# Patient Record
Sex: FEMALE | Race: WHITE | NOT HISPANIC OR LATINO | Employment: FULL TIME | URBAN - METROPOLITAN AREA
[De-identification: names, ages, dates, MRNs, and addresses within clinical notes are randomized per-mention and may not be internally consistent; named-entity substitution may affect disease eponyms.]

---

## 2023-02-21 PROBLEM — H52.13 MYOPIA, BILATERAL: Status: ACTIVE | Noted: 2023-02-21

## 2023-02-21 PROBLEM — M79.672 LEFT FOOT PAIN: Status: ACTIVE | Noted: 2023-02-21

## 2023-02-21 PROBLEM — M20.60 TOE DEFORMITY: Status: ACTIVE | Noted: 2023-02-21

## 2023-02-21 PROBLEM — S83.412A SPRAIN OF MEDIAL COLLATERAL LIGAMENT OF LEFT KNEE: Status: ACTIVE | Noted: 2023-02-21

## 2023-02-21 PROBLEM — M23.209 CHRONIC MENISCAL TEAR OF KNEE: Status: ACTIVE | Noted: 2023-02-21

## 2023-02-21 PROBLEM — M72.2 PLANTAR FASCIA SYNDROME: Status: ACTIVE | Noted: 2023-02-21

## 2023-02-21 PROBLEM — E66.813 OBESITY, CLASS III, BMI 40-49.9 (MORBID OBESITY): Status: ACTIVE | Noted: 2023-02-21

## 2023-02-21 PROBLEM — I10 ESSENTIAL HYPERTENSION: Status: ACTIVE | Noted: 2023-02-21

## 2023-02-21 PROBLEM — N91.2 AMENORRHEA: Status: ACTIVE | Noted: 2023-02-21

## 2023-02-21 PROBLEM — B37.2 YEAST INFECTION OF THE SKIN: Status: ACTIVE | Noted: 2023-02-21

## 2023-02-21 PROBLEM — S83.511A: Status: ACTIVE | Noted: 2023-02-21

## 2023-02-21 PROBLEM — E66.9 OBESITY (BMI 35.0-39.9 WITHOUT COMORBIDITY): Status: ACTIVE | Noted: 2023-02-21

## 2023-02-21 PROBLEM — M77.42 METATARSALGIA OF LEFT FOOT: Status: ACTIVE | Noted: 2023-02-21

## 2023-02-21 PROBLEM — R73.9 BLOOD GLUCOSE ELEVATED: Status: ACTIVE | Noted: 2023-02-21

## 2023-02-21 PROBLEM — O10.919 HTN IN PREGNANCY, CHRONIC (HHS-HCC): Status: ACTIVE | Noted: 2023-02-21

## 2023-02-21 PROBLEM — R92.8 ABNORMAL MAMMOGRAM: Status: ACTIVE | Noted: 2023-02-21

## 2023-02-21 PROBLEM — M25.562 LEFT KNEE PAIN: Status: ACTIVE | Noted: 2023-02-21

## 2023-02-21 PROBLEM — M79.671 RIGHT FOOT PAIN: Status: ACTIVE | Noted: 2023-02-21

## 2023-02-21 PROBLEM — E66.01 OBESITY, CLASS III, BMI 40-49.9 (MORBID OBESITY) (MULTI): Status: ACTIVE | Noted: 2023-02-21

## 2023-02-21 PROBLEM — M25.362 INSTABILITY OF LEFT KNEE JOINT: Status: ACTIVE | Noted: 2023-02-21

## 2023-02-21 PROBLEM — M84.376A STRESS REACTION OF FOOT: Status: ACTIVE | Noted: 2023-02-21

## 2023-02-21 PROBLEM — S83.241A TEAR OF MEDIAL MENISCUS OF RIGHT KNEE, INITIAL ENCOUNTER: Status: ACTIVE | Noted: 2023-02-21

## 2023-02-21 PROBLEM — M25.572 ARTHRALGIA OF ANKLE OR FOOT, LEFT: Status: ACTIVE | Noted: 2023-02-21

## 2023-02-21 PROBLEM — H60.509 ACUTE OTITIS EXTERNA: Status: ACTIVE | Noted: 2023-02-21

## 2023-02-21 PROBLEM — M17.11 PRIMARY OSTEOARTHRITIS OF RIGHT KNEE: Status: ACTIVE | Noted: 2023-02-21

## 2023-02-21 PROBLEM — E06.3 HASHIMOTO'S THYROIDITIS: Status: ACTIVE | Noted: 2023-02-21

## 2023-02-21 RX ORDER — MELOXICAM 15 MG/1
15 TABLET ORAL DAILY PRN
COMMUNITY
Start: 2021-07-08 | End: 2023-10-25 | Stop reason: ALTCHOICE

## 2023-02-21 RX ORDER — METFORMIN HYDROCHLORIDE 500 MG/1
500 TABLET ORAL DAILY
COMMUNITY
Start: 2022-07-13 | End: 2023-10-25 | Stop reason: ALTCHOICE

## 2023-02-21 RX ORDER — DULAGLUTIDE 4.5 MG/.5ML
0.5 INJECTION, SOLUTION SUBCUTANEOUS
COMMUNITY
Start: 2022-07-13 | End: 2023-03-21

## 2023-02-21 RX ORDER — SEMAGLUTIDE 1.34 MG/ML
INJECTION, SOLUTION SUBCUTANEOUS
COMMUNITY
End: 2023-10-25

## 2023-02-21 RX ORDER — PHENTERMINE HYDROCHLORIDE 37.5 MG/1
37.5 TABLET ORAL DAILY
COMMUNITY
Start: 2022-04-11 | End: 2023-03-21

## 2023-02-21 RX ORDER — LOSARTAN POTASSIUM 50 MG/1
50 TABLET ORAL DAILY
COMMUNITY
Start: 2022-04-11 | End: 2023-06-21

## 2023-02-21 RX ORDER — NAPROXEN SODIUM 220 MG/1
TABLET, FILM COATED ORAL
COMMUNITY

## 2023-02-21 RX ORDER — CHOLECALCIFEROL (VITAMIN D3) 50 MCG
TABLET ORAL
COMMUNITY
End: 2023-10-25 | Stop reason: ALTCHOICE

## 2023-02-21 RX ORDER — FLUOCINOLONE ACETONIDE 0.11 MG/ML
OIL AURICULAR (OTIC) 2 TIMES DAILY
COMMUNITY
Start: 2019-08-24 | End: 2023-12-12 | Stop reason: WASHOUT

## 2023-02-21 RX ORDER — LEVOTHYROXINE SODIUM 200 UG/1
1 TABLET ORAL DAILY
COMMUNITY
Start: 2019-07-02 | End: 2023-06-14 | Stop reason: ALTCHOICE

## 2023-02-22 PROBLEM — M25.561 RIGHT KNEE PAIN: Status: ACTIVE | Noted: 2023-02-22

## 2023-03-21 ENCOUNTER — OFFICE VISIT (OUTPATIENT)
Dept: PRIMARY CARE | Facility: CLINIC | Age: 48
End: 2023-03-21
Payer: COMMERCIAL

## 2023-03-21 VITALS
WEIGHT: 218 LBS | HEIGHT: 66 IN | BODY MASS INDEX: 35.03 KG/M2 | DIASTOLIC BLOOD PRESSURE: 82 MMHG | SYSTOLIC BLOOD PRESSURE: 117 MMHG

## 2023-03-21 DIAGNOSIS — Z12.31 BREAST CANCER SCREENING BY MAMMOGRAM: Primary | ICD-10-CM

## 2023-03-21 DIAGNOSIS — Z00.00 ROUTINE GENERAL MEDICAL EXAMINATION AT A HEALTH CARE FACILITY: ICD-10-CM

## 2023-03-21 PROBLEM — H60.509 ACUTE OTITIS EXTERNA: Status: RESOLVED | Noted: 2023-02-21 | Resolved: 2023-03-21

## 2023-03-21 PROBLEM — B37.2 YEAST INFECTION OF THE SKIN: Status: RESOLVED | Noted: 2023-02-21 | Resolved: 2023-03-21

## 2023-03-21 PROBLEM — N91.2 AMENORRHEA: Status: RESOLVED | Noted: 2023-02-21 | Resolved: 2023-03-21

## 2023-03-21 PROCEDURE — 3074F SYST BP LT 130 MM HG: CPT | Performed by: INTERNAL MEDICINE

## 2023-03-21 PROCEDURE — 99213 OFFICE O/P EST LOW 20 MIN: CPT | Performed by: INTERNAL MEDICINE

## 2023-03-21 PROCEDURE — 3079F DIAST BP 80-89 MM HG: CPT | Performed by: INTERNAL MEDICINE

## 2023-03-21 NOTE — PROGRESS NOTES
"Sobeida Ferrer presents in obesity FU.     1. Obesity s/p phentermine 5.22 and re-initiated 1.2023 with me   -Trulicity since summer 2022 until cost prohibitive   247 lbs in 7.22   222 lbs last visit 1.23 and again 2.14.23 despite initiaton phnetermine 1.23   -218 today   []d/c phentermine   []ozempic (1 mg dose) via [facility] is en route   []3 mo FU with me     2. HTN   -losart 50 4.22     3. Hypothyroidism   -ltx 200   -TSH at goal 4.22     4. DLD,  in 4.22     5. Ear psoriasis   -tacrolimus via derm     6. R knee pain, h/o L knee MCL sprain   -R knee MRI: Tricompartmental OA      #HM   -screen labs 4.22  -mammo due 3.31.23 - ordered  [ ]cscope now due - previously ordered   -tdap 2018 4/11/2022     9:07 AM 5/17/2022     8:10 AM 6/17/2022     9:50 AM 7/13/2022     8:52 AM 7/13/2022     8:54 AM   Vitals   Systolic 137 117 121     Diastolic 83 77 79     Heart Rate 76  87     Height (in) 1.676 m (5' 6\") 1.676 m (5' 6\") 1.676 m (5' 6\") 1.676 m (5' 6\") 1.676 m (5' 6\")   Weight (lb) 269 262 253  247   BMI 43.42 kg/m2 42.29 kg/m2 40.84 kg/m2 40.84 kg/m2 39.87 kg/m2   BSA (m2) 2.38 m2 2.35 m2 2.31 m2 2.31 m2 2.28 m2           "

## 2023-06-07 ENCOUNTER — LAB (OUTPATIENT)
Dept: LAB | Facility: LAB | Age: 48
End: 2023-06-07
Payer: COMMERCIAL

## 2023-06-07 DIAGNOSIS — Z00.00 ROUTINE GENERAL MEDICAL EXAMINATION AT A HEALTH CARE FACILITY: ICD-10-CM

## 2023-06-07 LAB
ALANINE AMINOTRANSFERASE (SGPT) (U/L) IN SER/PLAS: 11 U/L (ref 7–45)
ALBUMIN (G/DL) IN SER/PLAS: 4.3 G/DL (ref 3.4–5)
ALKALINE PHOSPHATASE (U/L) IN SER/PLAS: 75 U/L (ref 33–110)
ANION GAP IN SER/PLAS: 13 MMOL/L (ref 10–20)
ASPARTATE AMINOTRANSFERASE (SGOT) (U/L) IN SER/PLAS: 15 U/L (ref 9–39)
BASOPHILS (10*3/UL) IN BLOOD BY AUTOMATED COUNT: 0.05 X10E9/L (ref 0–0.1)
BASOPHILS/100 LEUKOCYTES IN BLOOD BY AUTOMATED COUNT: 0.6 % (ref 0–2)
BILIRUBIN TOTAL (MG/DL) IN SER/PLAS: 0.5 MG/DL (ref 0–1.2)
CALCIDIOL (25 OH VITAMIN D3) (NG/ML) IN SER/PLAS: 26 NG/ML
CALCIUM (MG/DL) IN SER/PLAS: 9.7 MG/DL (ref 8.6–10.6)
CARBON DIOXIDE, TOTAL (MMOL/L) IN SER/PLAS: 26 MMOL/L (ref 21–32)
CHLORIDE (MMOL/L) IN SER/PLAS: 106 MMOL/L (ref 98–107)
CHOLESTEROL (MG/DL) IN SER/PLAS: 174 MG/DL (ref 0–199)
CHOLESTEROL IN HDL (MG/DL) IN SER/PLAS: 45.6 MG/DL
CHOLESTEROL/HDL RATIO: 3.8
CREATININE (MG/DL) IN SER/PLAS: 0.77 MG/DL (ref 0.5–1.05)
EOSINOPHILS (10*3/UL) IN BLOOD BY AUTOMATED COUNT: 0.29 X10E9/L (ref 0–0.7)
EOSINOPHILS/100 LEUKOCYTES IN BLOOD BY AUTOMATED COUNT: 3.7 % (ref 0–6)
ERYTHROCYTE DISTRIBUTION WIDTH (RATIO) BY AUTOMATED COUNT: 12.9 % (ref 11.5–14.5)
ERYTHROCYTE MEAN CORPUSCULAR HEMOGLOBIN CONCENTRATION (G/DL) BY AUTOMATED: 33.6 G/DL (ref 32–36)
ERYTHROCYTE MEAN CORPUSCULAR VOLUME (FL) BY AUTOMATED COUNT: 88 FL (ref 80–100)
ERYTHROCYTES (10*6/UL) IN BLOOD BY AUTOMATED COUNT: 4.66 X10E12/L (ref 4–5.2)
ESTIMATED AVERAGE GLUCOSE FOR HBA1C: 103 MG/DL
GFR FEMALE: >90 ML/MIN/1.73M2
GLUCOSE (MG/DL) IN SER/PLAS: 89 MG/DL (ref 74–99)
HEMATOCRIT (%) IN BLOOD BY AUTOMATED COUNT: 41.1 % (ref 36–46)
HEMOGLOBIN (G/DL) IN BLOOD: 13.8 G/DL (ref 12–16)
HEMOGLOBIN A1C/HEMOGLOBIN TOTAL IN BLOOD: 5.2 %
IMMATURE GRANULOCYTES/100 LEUKOCYTES IN BLOOD BY AUTOMATED COUNT: 0.3 % (ref 0–0.9)
LDL: 112 MG/DL (ref 0–99)
LEUKOCYTES (10*3/UL) IN BLOOD BY AUTOMATED COUNT: 7.9 X10E9/L (ref 4.4–11.3)
LYMPHOCYTES (10*3/UL) IN BLOOD BY AUTOMATED COUNT: 2.69 X10E9/L (ref 1.2–4.8)
LYMPHOCYTES/100 LEUKOCYTES IN BLOOD BY AUTOMATED COUNT: 34 % (ref 13–44)
MONOCYTES (10*3/UL) IN BLOOD BY AUTOMATED COUNT: 0.5 X10E9/L (ref 0.1–1)
MONOCYTES/100 LEUKOCYTES IN BLOOD BY AUTOMATED COUNT: 6.3 % (ref 2–10)
NEUTROPHILS (10*3/UL) IN BLOOD BY AUTOMATED COUNT: 4.36 X10E9/L (ref 1.2–7.7)
NEUTROPHILS/100 LEUKOCYTES IN BLOOD BY AUTOMATED COUNT: 55.1 % (ref 40–80)
NRBC (PER 100 WBCS) BY AUTOMATED COUNT: 0 /100 WBC (ref 0–0)
PLATELETS (10*3/UL) IN BLOOD AUTOMATED COUNT: 346 X10E9/L (ref 150–450)
POTASSIUM (MMOL/L) IN SER/PLAS: 4.8 MMOL/L (ref 3.5–5.3)
PROTEIN TOTAL: 7.2 G/DL (ref 6.4–8.2)
SODIUM (MMOL/L) IN SER/PLAS: 140 MMOL/L (ref 136–145)
THYROTROPIN (MIU/L) IN SER/PLAS BY DETECTION LIMIT <= 0.05 MIU/L: 0.11 MIU/L (ref 0.44–3.98)
THYROXINE (T4) FREE (NG/DL) IN SER/PLAS: 1.33 NG/DL (ref 0.78–1.48)
TRIGLYCERIDE (MG/DL) IN SER/PLAS: 81 MG/DL (ref 0–149)
UREA NITROGEN (MG/DL) IN SER/PLAS: 14 MG/DL (ref 6–23)
VLDL: 16 MG/DL (ref 0–40)

## 2023-06-07 PROCEDURE — 82306 VITAMIN D 25 HYDROXY: CPT

## 2023-06-07 PROCEDURE — 80061 LIPID PANEL: CPT

## 2023-06-07 PROCEDURE — 85025 COMPLETE CBC W/AUTO DIFF WBC: CPT

## 2023-06-07 PROCEDURE — 80053 COMPREHEN METABOLIC PANEL: CPT

## 2023-06-07 PROCEDURE — 36415 COLL VENOUS BLD VENIPUNCTURE: CPT

## 2023-06-07 PROCEDURE — 84443 ASSAY THYROID STIM HORMONE: CPT

## 2023-06-07 PROCEDURE — 84439 ASSAY OF FREE THYROXINE: CPT

## 2023-06-07 PROCEDURE — 83036 HEMOGLOBIN GLYCOSYLATED A1C: CPT

## 2023-06-13 ENCOUNTER — PATIENT MESSAGE (OUTPATIENT)
Dept: PRIMARY CARE | Facility: CLINIC | Age: 48
End: 2023-06-13
Payer: COMMERCIAL

## 2023-06-13 DIAGNOSIS — E03.9 ADULT HYPOTHYROIDISM: Primary | ICD-10-CM

## 2023-06-14 RX ORDER — LEVOTHYROXINE SODIUM 175 UG/1
175 TABLET ORAL
Qty: 90 TABLET | Refills: 3 | Status: SHIPPED | OUTPATIENT
Start: 2023-06-14 | End: 2023-06-14

## 2023-06-21 ENCOUNTER — OFFICE VISIT (OUTPATIENT)
Dept: PRIMARY CARE | Facility: CLINIC | Age: 48
End: 2023-06-21
Payer: COMMERCIAL

## 2023-06-21 VITALS
BODY MASS INDEX: 34.55 KG/M2 | SYSTOLIC BLOOD PRESSURE: 117 MMHG | WEIGHT: 215 LBS | HEIGHT: 66 IN | DIASTOLIC BLOOD PRESSURE: 72 MMHG

## 2023-06-21 DIAGNOSIS — T75.3XXA MOTION SICKNESS, INITIAL ENCOUNTER: ICD-10-CM

## 2023-06-21 DIAGNOSIS — I10 BENIGN ESSENTIAL HYPERTENSION: Primary | ICD-10-CM

## 2023-06-21 PROCEDURE — 99213 OFFICE O/P EST LOW 20 MIN: CPT | Performed by: INTERNAL MEDICINE

## 2023-06-21 PROCEDURE — 3078F DIAST BP <80 MM HG: CPT | Performed by: INTERNAL MEDICINE

## 2023-06-21 PROCEDURE — 3074F SYST BP LT 130 MM HG: CPT | Performed by: INTERNAL MEDICINE

## 2023-06-21 RX ORDER — LOSARTAN POTASSIUM 25 MG/1
25 TABLET ORAL DAILY
Qty: 90 TABLET | Refills: 3 | Status: SHIPPED | OUTPATIENT
Start: 2023-06-21 | End: 2023-06-21

## 2023-06-21 RX ORDER — SCOLOPAMINE TRANSDERMAL SYSTEM 1 MG/1
1 PATCH, EXTENDED RELEASE TRANSDERMAL
Qty: 10 PATCH | Refills: 0 | Status: SHIPPED | OUTPATIENT
Start: 2023-06-21 | End: 2023-08-20

## 2023-06-21 NOTE — PROGRESS NOTES
"    Sobeida Ferrer presents in 3 month FU.        1. Obesity s/p phentermine 5.22 and re-initiated 1.2023 with me   -Trulicity since summer 2022 until cost prohibitive   269 in 4.22   247 lbs in 7.22   222 lbs last visit 1.23 and again 2.14.23 despite initiaton phnetermine 1.23   218 last visit 3.23   215 today 6.23     -switched to Mounjaro via [facility]        2. HTN   -losart 50 4.22   [ ]dose reduce to 25 mg today and re-assess 10.2023 followup      3. Hypothyroidism   -ltx 200   -TSH mildly suppressed 0.11 on 6.7.23 labs   [ ]reduce to 175 mcg      4. DLD,  in 4.22 - improved to 112 6.23      5. Ear psoriasis   -tacrolimus via derm      6. R knee pain, h/o L knee MCL sprain   -R knee MRI: Tricompartmental OA      #HM   -screen labs 4.22  -mammo due 3.31.23   [ ]cscope now due - previously ordered -   -tdap 2018       66\"   218 last visit --> 215     " MRI is essentially normal. Need to obtain notes from Dr Owen Sunshine who ordered this study. She was not seen here in February as planned. Needs re-evaluation before can consider a referral. However she clearly does not need surgery, so seeing a surgeon is is not indicated. We can discuss other options at [facility].

## 2023-10-13 ENCOUNTER — PHARMACY VISIT (OUTPATIENT)
Dept: PHARMACY | Facility: CLINIC | Age: 48
End: 2023-10-13
Payer: COMMERCIAL

## 2023-10-13 PROCEDURE — RXMED WILLOW AMBULATORY MEDICATION CHARGE

## 2023-10-13 RX ORDER — CIPROFLOXACIN AND DEXAMETHASONE 3; 1 MG/ML; MG/ML
4 SUSPENSION/ DROPS AURICULAR (OTIC) 2 TIMES DAILY
Qty: 7.5 ML | Refills: 0 | OUTPATIENT
Start: 2023-10-13 | End: 2023-12-12 | Stop reason: WASHOUT

## 2023-10-16 ENCOUNTER — PHARMACY VISIT (OUTPATIENT)
Dept: PHARMACY | Facility: CLINIC | Age: 48
End: 2023-10-16
Payer: COMMERCIAL

## 2023-10-17 ENCOUNTER — CLINICAL SUPPORT (OUTPATIENT)
Dept: AUDIOLOGY | Facility: CLINIC | Age: 48
End: 2023-10-17
Payer: COMMERCIAL

## 2023-10-17 DIAGNOSIS — Z01.10 EXAMINATION OF EARS AND HEARING: ICD-10-CM

## 2023-10-17 DIAGNOSIS — R94.128 FLAT TYMPANOGRAM OF BOTH EARS: Primary | ICD-10-CM

## 2023-10-17 PROCEDURE — 92550 TYMPANOMETRY & REFLEX THRESH: CPT

## 2023-10-17 PROCEDURE — 92557 COMPREHENSIVE HEARING TEST: CPT

## 2023-10-17 NOTE — LETTER
2023     David Linder MD  [address]  [address]  [facility] [address]    Patient: Sobeida Ferrer   YOB: 1975   Date of Visit: 10/17/2023       Dear Dr. David Linder MD:    Thank you for referring Sobeida Ferrer to me for evaluation. Below are my notes for this consultation.  If you have questions, please do not hesitate to call me. I look forward to following your patient along with you.       Sincerely,     Steve Ibarra      CC: No Recipients  ______________________________________________________________________________________    ADULT AUDIOLOGY AUDIOMETRIC EVALUATION    Name:  Sobeida Ferrer  :  1975  Age:  47 y.o.  Date of Evaluation:  10/17/2023    HISTORY  Dr. 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  Patient arrives with the complaint of bilateral ear pressure, right worse than left, after using a water slide on vacation without swim plugs. Patient denies ear pain, ear drainage,  ear surgeries, tinnitus, noise exposure, family history of hearing loss and dizziness/vertigo.      AUDIOLOGIC EVALUATION    OTOSCOPY  Otoscopic inspection revealed clear canals and visualization of the eardrum bilaterally.    IMMITTANCE  Normal tympanogram obtained in the left ear, consistent with a normal moving eardrums and the likely absence of fluid. Flat tympanogram with normal ear canal volume obtained in the right ear, consistent with abnormal moving eardrum.    Ipsilateral acoustic reflexes were tested and present  in the left ear at 500Hz, 1000Hz, 2000Hz, and 4000Hz. Not tested in right ear due to abnormal tympanogram.    AUDIOMETRIC TESTING  Pure tone audiometry conducted via insert headphones from 125 Hz - 8000 Hz with good reliability was consistent with normal hearing bilaterally.    SPEECH RECOGNITION TESTING (SRT)  SRT was in agreement with pure tone averages bilaterally ( Right: 10 dB HL, Left: 5 dB  HL).    WORD RECOGNITION SCORE (WRS)  WRS was (100%) in the right ear and (100%) in the left ear using recorded ordered by difficulty NU6 word list.    IMPRESSIONS:  The results of today's assessment after consistent with tympanograms, acoustic reflexes, DPOAEs, and  hearing loss bilaterally. The patient was counseled with regard to the findings.    RECOMMENDATIONS:  Treatment Plan:.  Follow up with ENT/PCP as recommended.  Follow up sooner if patient feels hearing or symptoms have changed.  Contact the clinic with questions or concerns at [phone].     PATIENT EDUCATION:   Discussed results and recommendations with patient.  Questions were addressed and the patient was encouraged to contact our department should concerns arise.      Steve Ibarra, CCC-A, [facility]  Licensed Audiologist

## 2023-10-17 NOTE — PROGRESS NOTES
ADULT AUDIOLOGY AUDIOMETRIC EVALUATION    Name:  Sobeida Ferrer  :  1975  Age:  47 y.o.  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  Patient arrives with the complaint of bilateral ear pressure, right worse than left, after using a water slide on vacation without swim plugs. Patient denies ear pain, ear drainage,  ear surgeries, tinnitus, noise exposure, family history of hearing loss and dizziness/vertigo.      AUDIOLOGIC EVALUATION    OTOSCOPY  Otoscopic inspection revealed clear canals and visualization of the eardrum bilaterally.    IMMITTANCE  Normal tympanogram obtained in the left ear, consistent with a normal moving eardrums and the likely absence of fluid. Flat tympanogram with normal ear canal volume obtained in the right ear, consistent with abnormal moving eardrum.    Ipsilateral acoustic reflexes were tested and present  in the left ear at 500Hz, 1000Hz, 2000Hz, and 4000Hz. Not tested in right ear due to abnormal tympanogram.    AUDIOMETRIC TESTING  Pure tone audiometry conducted via insert headphones from 125 Hz - 8000 Hz with good reliability was consistent with normal hearing bilaterally.    SPEECH RECOGNITION TESTING (SRT)  SRT was in agreement with pure tone averages bilaterally ( Right: 10 dB HL, Left: 5 dB HL).    WORD RECOGNITION SCORE (WRS)  WRS was (100%) in the right ear and (100%) in the left ear using recorded ordered by difficulty NU6 word list.    IMPRESSIONS:  The results of today's assessment after consistent with tympanograms, acoustic reflexes, DPOAEs, and  hearing loss bilaterally. The patient was counseled with regard to the findings.    RECOMMENDATIONS:  Treatment Plan:.  Follow up with ENT/PCP as recommended.  Follow up sooner if patient feels hearing or symptoms have changed.  Contact the clinic with questions or concerns at [phone].     PATIENT EDUCATION:    Discussed results and recommendations with patient.  Questions were addressed and the patient was encouraged to contact our department should concerns arise.      Steve Ibarra, CCC-A, [facility]  Licensed Audiologist

## 2023-10-18 ENCOUNTER — OFFICE VISIT (OUTPATIENT)
Dept: OTOLARYNGOLOGY | Facility: CLINIC | Age: 48
End: 2023-10-18
Payer: COMMERCIAL

## 2023-10-18 VITALS — TEMPERATURE: 98.1 F

## 2023-10-18 DIAGNOSIS — H93.8X1 SENSATION OF FULLNESS IN RIGHT EAR: Primary | ICD-10-CM

## 2023-10-18 PROCEDURE — 1036F TOBACCO NON-USER: CPT | Performed by: STUDENT IN AN ORGANIZED HEALTH CARE EDUCATION/TRAINING PROGRAM

## 2023-10-18 PROCEDURE — 92511 NASOPHARYNGOSCOPY: CPT | Performed by: STUDENT IN AN ORGANIZED HEALTH CARE EDUCATION/TRAINING PROGRAM

## 2023-10-18 PROCEDURE — 99203 OFFICE O/P NEW LOW 30 MIN: CPT | Performed by: STUDENT IN AN ORGANIZED HEALTH CARE EDUCATION/TRAINING PROGRAM

## 2023-10-18 RX ORDER — DOCUSATE SODIUM 100 MG/1
CAPSULE, LIQUID FILLED ORAL 2 TIMES DAILY PRN
COMMUNITY
Start: 2018-08-30

## 2023-10-18 RX ORDER — TACROLIMUS 1 MG/G
OINTMENT TOPICAL
COMMUNITY
Start: 2023-01-09

## 2023-10-18 ASSESSMENT — ENCOUNTER SYMPTOMS
RESPIRATORY NEGATIVE: 1
NAUSEA: 1
CONSTITUTIONAL NEGATIVE: 1
CARDIOVASCULAR NEGATIVE: 1
NEUROLOGICAL NEGATIVE: 1

## 2023-10-18 ASSESSMENT — PATIENT HEALTH QUESTIONNAIRE - PHQ9
SUM OF ALL RESPONSES TO PHQ9 QUESTIONS 1 AND 2: 0
2. FEELING DOWN, DEPRESSED OR HOPELESS: NOT AT ALL
1. LITTLE INTEREST OR PLEASURE IN DOING THINGS: NOT AT ALL

## 2023-10-18 NOTE — LETTER
October 18, 2023     Bhavin Lovell MD  [address]  [facility] [address]    Patient: Sobeida Ferrre   YOB: 1975   Date of Visit: 10/18/2023       Dear Dr. Bhavin Lovell MD:    Thank you for referring Sobeida Ferrer to me for evaluation. Below are my notes for this consultation.  If you have questions, please do not hesitate to call me. I look forward to following your patient along with you.       Sincerely,     David Linder MD      CC: No Recipients  ______________________________________________________________________________________    CHIEF COMPLAINT:   Chief Complaint   Patient presents with   • New Patient Visit       HISTORY OF PRESENT ILLNESS: Sobeida Ferrer is a 47 y.o. female who presents today with symptoms of Right ear fullness, muffled hearing, feeling like she is in a wind tunnel, and hearing herself breath.  She reports that this all started two weeks ago when she was on a [place] Cruise and went down a water slide and felt water go into her ears.  She had sudden pain, which has improved, but she has this persistent feeling of fullness and muffled hearing that is worse on the right.  She also notes that she has certain fans and lights that make sounds that make her nauseous.  She denies ear surgery or ear tubes.  She also has a history of thyroid disease and has significant weight fluctuations in the past.    PAST MEDICAL HISTORY:   Past Medical History:   Diagnosis Date   • Encounter for other procreative management 07/15/2017    Encounter for artificial insemination   • Encounter for prophylactic Rho(d) immune globulin 11/15/2016    Need for rhogam due to Rh negative mother   • Encounter for routine postpartum follow-up 10/08/2018    Encounter for postpartum visit   • Endocrine, nutritional and metabolic diseases complicating pregnancy, unspecified trimester 03/19/2019    Hypothyroidism affecting pregnancy   • Inappropriate change in quantitative human chorionic gonadotropin  (hCG) in early pregnancy 2018    Chemical pregnancy   • Other conditions influencing health status     Cyst   • Other specified health status     No pertinent past medical history   • Personal history of other diseases of the female genital tract 2018    History of dysmenorrhea   • Polyp of corpus uteri 2017    Endometrial polyp   • Recurrent pregnancy loss 2017    Recurrent pregnancy loss without current pregnancy   • Supervision of elderly multigravida, unspecified trimester 2019    AMA (advanced maternal age) multigravida 35+   • Supervision of elderly primigravida, unspecified trimester 11/15/2016    AMA (advanced maternal age) primigravida 35+   • Threatened  2017    Threatened miscarriage in early pregnancy       PAST SURGICAL HISTORY:   Past Surgical History:   Procedure Laterality Date   • CHOLECYSTECTOMY  2018    Cholecystectomy       MEDICATIONS:   Current Outpatient Medications:   •  aspirin 81 mg chewable tablet, -, Disp: , Rfl:   •  ciprofloxacin-dexamethasone (CiproDEX) otic suspension, Administer 4 drops into the right ear 2 times a day for 7 days., Disp: 7.5 mL, Rfl: 0  •  docusate sodium (Colace) 100 mg capsule, Take by mouth 2 times a day as needed., Disp: , Rfl:   •  fluocinolone (DermOtic) 0.01 % ear drops, Administer into affected ear(s) 2 times a day., Disp: , Rfl:   •  levothyroxine (Synthroid, Levoxyl) 175 mcg tablet, TAKE 1 TABLET BY MOUTH EVERY MORNING BEFORE MEALS., Disp: 90 tablet, Rfl: 3  •  losartan (Cozaar) 25 mg tablet, TAKE 1 TABLET (25 MG) BY MOUTH ONCE DAILY., Disp: 90 tablet, Rfl: 3  •  tacrolimus (Protopic) 0.1 % ointment, 1 Application, Disp: , Rfl:   •  TIRZEPATIDE SUBQ, Inject 0.2 mL under the skin 1 (one) time per week., Disp: , Rfl:   •  cholecalciferol (Vitamin D-3) 50 MCG ( UT) tablet, -, Disp: , Rfl:   •  meloxicam (Mobic) 15 mg tablet, Take 1 tablet (15 mg) by mouth once daily as needed., Disp: , Rfl:   •  metFORMIN  (Glucophage) 500 mg tablet, Take 1 tablet (500 mg) by mouth once daily., Disp: , Rfl:   •  prenatal vit 93/iron fum/folic (PRENATAL FORMULA ORAL), -, Disp: , Rfl:   •  semaglutide (Ozempic) 1 mg/dose (4 mg/3 mL) pen injector, Inject under the skin 1 (one) time per week., Disp: , Rfl:     ALLERGIES: No Known Allergies    SOCIAL HISTORY:   reports that she has never smoked. She has never used smokeless tobacco.    FAMILY HISTORY: family history includes Atrial fibrillation in her mother; Cataracts in her mother; Fuchs' dystrophy in her mother; Glaucoma in her mother; Macular degeneration in an other family member.    REVIEW OF SYSTEMS  Review of Systems   Constitutional: Negative.    HENT:  Positive for ear pain and hearing loss. Negative for ear discharge.    Respiratory: Negative.     Cardiovascular: Negative.    Gastrointestinal:  Positive for nausea.   Neurological: Negative.        PHYSICAL EXAM:    VITALS:   Vitals:    10/18/23 1302   Temp: 36.7 °C (98.1 °F)        GENERAL: healthy, alert, well developed, well nourished, no distress, cooperative, appears chronologic age    Communicates with normal voice and without hearing aids.    HEAD: atraumatic, normocephalic, no lesions    EYES: normal, PERRLA and EOM's intact    EARS:   Right ear demonstrates a patent external auditory canal with a normal tympanic membrane.  The tympanic membrane moves with respiration with occlusion of the left nare.  There is no effusion.  There is no evidence of tympanic membrane perforation or infection.  Left ear: demonstrates a patent external auditory canal with a normal tympanic membrane.   Angel tuning fork test lateralizes right 512 Hz.   Rinne testing with a 512 Hz tuning fork: Right Air conduction > Bone conduction   Left Air conduction > Bone conduction    She does not hear a 256 Hz tuning fork on her elbow.    NOSE: nose shows no deformity, asymmetry, or inflammation, nasal mucosa normal, septum midline with no perforation or  bleeding, turbinates normal, no polyps, no sinus tenderness    ORAL CAVITY/OROPHARYNX: negative findings: lips normal without lesions, buccal mucosa normal, gums healthy, teeth intact, non-carious, palate normal, tongue midline and normal, soft palate, uvula, and tonsils normal    NECK AND SALIVARY GLANDS: Neck is supple without masses or lymphadenopathy. Palpation of the parotid and submandibular gland reveals no masses or tenderness to palpation.    CRANIAL NERVES: intact,   Facial nerve exam  is House - Brackmann 1- Normal Function on the right and 1- Normal Function on the left.    CARDIOVASCULAR: radial pulse is palpable and regular, no evidence of peripheral edema    PULMONARY: normal lung excursion, no evidence of retractions    DATA REVIEWED:  Audiogram  I reviewed the audiogram from 10/17/2023, which demonstrates bilateral normal hearing with a right-sided type B tympanogram.  Word recognition score was excellent bilaterally    Procedure: Nasopharyngoscopy  Verbal consent was obtained.  The nasal cavity was anesthetized with a combination of Afrin and lidocaine.  The endoscope was then passed through the left nasal cavity along the floor until the nasopharynx was entered.  There were no masses in the nasopharynx or obstructing the eustachian tubes.  Of note, the right eustachian tube appeared to be more open compared to the left eustachian tube orifice.  The patient tolerated the procedure without complication.    IMPRESSION:   1) right ear fullness, muffled hearing, nausea with certain noises, noisy respirations --she does not have evidence of an infection or tympanic membrane perforation.  I also do not see any evidence of a middle ear effusion.  Based on the movement of the right tympanic membrane as well as nasopharyngoscopy, I suspect that she has right-sided patulous eustachian tube.  However, this does not quite make sense with the timing and onset of this after a water slide or potential pressure  event.  Superior semicircular canal dehiscence is another possibility given the timing and onset with a potential pressure/trauma issue in the ear.    PLAN:  Because she has only had the symptoms for 2 weeks in the setting of possible trauma or pressure, I would like to see if this will not resolve spontaneously.  I therefore recommend follow-up in 6 weeks.  If she does not have improvement in her symptoms in 6 weeks, I would then obtain a CT IAC without contrast to evaluate for possible superior semicircular canal dehiscence.  I would also consider a trial of PatulEND.

## 2023-10-18 NOTE — PROGRESS NOTES
CHIEF COMPLAINT:   Chief Complaint   Patient presents with    New Patient Visit       HISTORY OF PRESENT ILLNESS: Sobeida Ferrer is a 47 y.o. female who presents today with symptoms of Right ear fullness, muffled hearing, feeling like she is in a wind tunnel, and hearing herself breath.  She reports that this all started two weeks ago when she was on a [place] Cruise and went down a water slide and felt water go into her ears.  She had sudden pain, which has improved, but she has this persistent feeling of fullness and muffled hearing that is worse on the right.  She also notes that she has certain fans and lights that make sounds that make her nauseous.  She denies ear surgery or ear tubes.  She also has a history of thyroid disease and has significant weight fluctuations in the past.    PAST MEDICAL HISTORY:   Past Medical History:   Diagnosis Date    Encounter for other procreative management 07/15/2017    Encounter for artificial insemination    Encounter for prophylactic Rho(d) immune globulin 11/15/2016    Need for rhogam due to Rh negative mother    Encounter for routine postpartum follow-up 10/08/2018    Encounter for postpartum visit    Endocrine, nutritional and metabolic diseases complicating pregnancy, unspecified trimester 03/19/2019    Hypothyroidism affecting pregnancy    Inappropriate change in quantitative human chorionic gonadotropin (hCG) in early pregnancy 05/22/2018    Chemical pregnancy    Other conditions influencing health status     Cyst    Other specified health status     No pertinent past medical history    Personal history of other diseases of the female genital tract 01/29/2018    History of dysmenorrhea    Polyp of corpus uteri 12/12/2017    Endometrial polyp    Recurrent pregnancy loss 12/19/2017    Recurrent pregnancy loss without current pregnancy    Supervision of elderly multigravida, unspecified trimester 03/19/2019    AMA (advanced maternal age) multigravida 35+    Supervision  of elderly primigravida, unspecified trimester 11/15/2016    AMA (advanced maternal age) primigravida 35+    Threatened  2017    Threatened miscarriage in early pregnancy       PAST SURGICAL HISTORY:   Past Surgical History:   Procedure Laterality Date    CHOLECYSTECTOMY  2018    Cholecystectomy       MEDICATIONS:   Current Outpatient Medications:     aspirin 81 mg chewable tablet, -, Disp: , Rfl:     ciprofloxacin-dexamethasone (CiproDEX) otic suspension, Administer 4 drops into the right ear 2 times a day for 7 days., Disp: 7.5 mL, Rfl: 0    docusate sodium (Colace) 100 mg capsule, Take by mouth 2 times a day as needed., Disp: , Rfl:     fluocinolone (DermOtic) 0.01 % ear drops, Administer into affected ear(s) 2 times a day., Disp: , Rfl:     levothyroxine (Synthroid, Levoxyl) 175 mcg tablet, TAKE 1 TABLET BY MOUTH EVERY MORNING BEFORE MEALS., Disp: 90 tablet, Rfl: 3    losartan (Cozaar) 25 mg tablet, TAKE 1 TABLET (25 MG) BY MOUTH ONCE DAILY., Disp: 90 tablet, Rfl: 3    tacrolimus (Protopic) 0.1 % ointment, 1 Application, Disp: , Rfl:     TIRZEPATIDE SUBQ, Inject 0.2 mL under the skin 1 (one) time per week., Disp: , Rfl:     cholecalciferol (Vitamin D-3) 50 MCG (2000 UT) tablet, -, Disp: , Rfl:     meloxicam (Mobic) 15 mg tablet, Take 1 tablet (15 mg) by mouth once daily as needed., Disp: , Rfl:     metFORMIN (Glucophage) 500 mg tablet, Take 1 tablet (500 mg) by mouth once daily., Disp: , Rfl:     prenatal vit 93/iron fum/folic (PRENATAL FORMULA ORAL), -, Disp: , Rfl:     semaglutide (Ozempic) 1 mg/dose (4 mg/3 mL) pen injector, Inject under the skin 1 (one) time per week., Disp: , Rfl:     ALLERGIES: No Known Allergies    SOCIAL HISTORY:   reports that she has never smoked. She has never used smokeless tobacco.    FAMILY HISTORY: family history includes Atrial fibrillation in her mother; Cataracts in her mother; Fuchs' dystrophy in her mother; Glaucoma in her mother; Macular degeneration in  an other family member.    REVIEW OF SYSTEMS  Review of Systems   Constitutional: Negative.    HENT:  Positive for ear pain and hearing loss. Negative for ear discharge.    Respiratory: Negative.     Cardiovascular: Negative.    Gastrointestinal:  Positive for nausea.   Neurological: Negative.        PHYSICAL EXAM:    VITALS:   Vitals:    10/18/23 1302   Temp: 36.7 °C (98.1 °F)        GENERAL: healthy, alert, well developed, well nourished, no distress, cooperative, appears chronologic age    Communicates with normal voice and without hearing aids.    HEAD: atraumatic, normocephalic, no lesions    EYES: normal, PERRLA and EOM's intact    EARS:   Right ear demonstrates a patent external auditory canal with a normal tympanic membrane.  The tympanic membrane moves with respiration with occlusion of the left nare.  There is no effusion.  There is no evidence of tympanic membrane perforation or infection.  Left ear: demonstrates a patent external auditory canal with a normal tympanic membrane.   Angel tuning fork test lateralizes right 512 Hz.   Rinne testing with a 512 Hz tuning fork: Right Air conduction > Bone conduction   Left Air conduction > Bone conduction    She does not hear a 256 Hz tuning fork on her elbow.    NOSE: nose shows no deformity, asymmetry, or inflammation, nasal mucosa normal, septum midline with no perforation or bleeding, turbinates normal, no polyps, no sinus tenderness    ORAL CAVITY/OROPHARYNX: negative findings: lips normal without lesions, buccal mucosa normal, gums healthy, teeth intact, non-carious, palate normal, tongue midline and normal, soft palate, uvula, and tonsils normal    NECK AND SALIVARY GLANDS: Neck is supple without masses or lymphadenopathy. Palpation of the parotid and submandibular gland reveals no masses or tenderness to palpation.    CRANIAL NERVES: intact,   Facial nerve exam  is House - Brackmann 1- Normal Function on the right and 1- Normal Function on the  left.    CARDIOVASCULAR: radial pulse is palpable and regular, no evidence of peripheral edema    PULMONARY: normal lung excursion, no evidence of retractions    DATA REVIEWED:  Audiogram  I reviewed the audiogram from 10/17/2023, which demonstrates bilateral normal hearing with a right-sided type B tympanogram.  Word recognition score was excellent bilaterally    Procedure: Nasopharyngoscopy  Verbal consent was obtained.  The nasal cavity was anesthetized with a combination of Afrin and lidocaine.  The endoscope was then passed through the left nasal cavity along the floor until the nasopharynx was entered.  There were no masses in the nasopharynx or obstructing the eustachian tubes.  Of note, the right eustachian tube appeared to be more open compared to the left eustachian tube orifice.  The patient tolerated the procedure without complication.    IMPRESSION:   1) right ear fullness, muffled hearing, nausea with certain noises, noisy respirations --she does not have evidence of an infection or tympanic membrane perforation.  I also do not see any evidence of a middle ear effusion.  Based on the movement of the right tympanic membrane as well as nasopharyngoscopy, I suspect that she has right-sided patulous eustachian tube.  However, this does not quite make sense with the timing and onset of this after a water slide or potential pressure event.  Superior semicircular canal dehiscence is another possibility given the timing and onset with a potential pressure/trauma issue in the ear.    PLAN:  Because she has only had the symptoms for 2 weeks in the setting of possible trauma or pressure, I would like to see if this will not resolve spontaneously.  I therefore recommend follow-up in 6 weeks.  If she does not have improvement in her symptoms in 6 weeks, I would then obtain a CT IAC without contrast to evaluate for possible superior semicircular canal dehiscence.  I would also consider a trial of PatulEND.

## 2023-10-25 ENCOUNTER — OFFICE VISIT (OUTPATIENT)
Dept: PRIMARY CARE | Facility: CLINIC | Age: 48
End: 2023-10-25
Payer: COMMERCIAL

## 2023-10-25 VITALS — DIASTOLIC BLOOD PRESSURE: 77 MMHG | SYSTOLIC BLOOD PRESSURE: 117 MMHG | BODY MASS INDEX: 35.9 KG/M2 | WEIGHT: 222.4 LBS

## 2023-10-25 DIAGNOSIS — Z86.39 PERSONAL HISTORY OF OTHER ENDOCRINE, NUTRITIONAL AND METABOLIC DISEASE: Primary | ICD-10-CM

## 2023-10-25 PROCEDURE — 1036F TOBACCO NON-USER: CPT | Performed by: INTERNAL MEDICINE

## 2023-10-25 PROCEDURE — 3078F DIAST BP <80 MM HG: CPT | Performed by: INTERNAL MEDICINE

## 2023-10-25 PROCEDURE — 99214 OFFICE O/P EST MOD 30 MIN: CPT | Performed by: INTERNAL MEDICINE

## 2023-10-25 PROCEDURE — 3074F SYST BP LT 130 MM HG: CPT | Performed by: INTERNAL MEDICINE

## 2023-10-25 NOTE — PROGRESS NOTES
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     *Episode of bilateral otalgia in interval after water slide event while on cruise; muffled hearing persisted, saw ENT 10.18.23 with no evidence of infection or perforation or effusion. Suspected R sided patulous ET. Superior semicircular canal dehiscense also a possibility related to barotrauma. Rec'ed FU in 6 wks and watchful waiting in interval and if did not resolve consider CT IAC. Also trialed PatulEND.      1. Obesity s/p phentermine 5.22 and re-initiated 1.2023 with me   -Trulicity since summer 2022 until cost prohibitive   269 in 4.22   247 lbs in 7.22   222 lbs last visit 1.23 and again 2.14.23 despite initiaton phnetermine 1.23   218 last visit 3.23   215 6.23   -feels up 5 lbs from there despite continued use of compounded tirzeptide use which she does not feel efficacy with      -switched to Mounjaro via [facility]         2. HTN   -losart 50 4.22   [ ]dose reduced to 25 mg today and re-assess 10.2023 followup      3. Hypothyroidism   -ltx 200   -TSH mildly suppressed 0.11 on 6.7.23 labs and reduced to 175 mcg at that time      4. DLD,  in 4.22 - improved to 112 6.23      5. Ear psoriasis   -tacrolimus via derm      6. R knee pain, h/o L knee MCL sprain   -R knee MRI: Tricompartmental OA      #HM   -screen labs 6.2023  -mammo due 3.31.23   [ ]cscope now due - previously ordered -   -tdap 2018         66\"   218 last visit --> 215       O   Gen alert well appearing NAD         A/P   -can trial semaglutide on brand click method dosing as opposed to compounded tirzapetide product to see if improved efficacy. Discussed at length dosing strategy.   -FU 3 mo.     "

## 2023-10-26 ENCOUNTER — PHARMACY VISIT (OUTPATIENT)
Dept: PHARMACY | Facility: CLINIC | Age: 48
End: 2023-10-26
Payer: COMMERCIAL

## 2023-10-26 PROCEDURE — RXMED WILLOW AMBULATORY MEDICATION CHARGE

## 2023-12-08 DIAGNOSIS — Z86.39 PERSONAL HISTORY OF OTHER ENDOCRINE, NUTRITIONAL AND METABOLIC DISEASE: ICD-10-CM

## 2023-12-08 DIAGNOSIS — Z86.39 PERSONAL HISTORY OF OTHER ENDOCRINE, NUTRITIONAL AND METABOLIC DISEASE: Primary | ICD-10-CM

## 2023-12-12 ENCOUNTER — OFFICE VISIT (OUTPATIENT)
Dept: OTOLARYNGOLOGY | Facility: CLINIC | Age: 48
End: 2023-12-12
Payer: COMMERCIAL

## 2023-12-12 VITALS — BODY MASS INDEX: 35.9 KG/M2 | TEMPERATURE: 97.5 F | HEIGHT: 66 IN

## 2023-12-12 DIAGNOSIS — H93.8X2 SENSATION OF FULLNESS IN LEFT EAR: Primary | ICD-10-CM

## 2023-12-12 PROCEDURE — 1036F TOBACCO NON-USER: CPT | Performed by: STUDENT IN AN ORGANIZED HEALTH CARE EDUCATION/TRAINING PROGRAM

## 2023-12-12 PROCEDURE — 99213 OFFICE O/P EST LOW 20 MIN: CPT | Performed by: STUDENT IN AN ORGANIZED HEALTH CARE EDUCATION/TRAINING PROGRAM

## 2023-12-12 ASSESSMENT — ENCOUNTER SYMPTOMS
RESPIRATORY NEGATIVE: 1
CARDIOVASCULAR NEGATIVE: 1
NAUSEA: 1
NEUROLOGICAL NEGATIVE: 1
CONSTITUTIONAL NEGATIVE: 1

## 2023-12-12 ASSESSMENT — PATIENT HEALTH QUESTIONNAIRE - PHQ9
SUM OF ALL RESPONSES TO PHQ9 QUESTIONS 1 & 2: 0
2. FEELING DOWN, DEPRESSED OR HOPELESS: NOT AT ALL
1. LITTLE INTEREST OR PLEASURE IN DOING THINGS: NOT AT ALL

## 2023-12-13 NOTE — PROGRESS NOTES
CHIEF COMPLAINT:   Chief Complaint   Patient presents with    Follow-up     Right ear.       HISTORY OF PRESENT ILLNESS from 10/18/2023: Sobeida Ferrer is a 48 y.o. female who presents today with symptoms of Right ear fullness, muffled hearing, feeling like she is in a wind tunnel, and hearing herself breath.  She reports that this all started two weeks ago when she was on a [employer] [place] and went down a water slide and felt water go into her ears.  She had sudden pain, which has improved, but she has this persistent feeling of fullness and muffled hearing that is worse on the right.  She also notes that she has certain fans and lights that make sounds that make her nauseous.  She denies ear surgery or ear tubes.  She also has a history of thyroid disease and has significant weight fluctuations in the past.    Interval History: She reports that her right ear has improved, but now she has left ear fullness and similar symptoms in the left ear.  She reports that she also has had URI symptoms for the past 4 weeks while she has been having these symptoms on the left ear.    PAST MEDICAL HISTORY:   Past Medical History:   Diagnosis Date    Encounter for other procreative management 07/15/2017    Encounter for artificial insemination    Encounter for prophylactic Rho(d) immune globulin 11/15/2016    Need for rhogam due to Rh negative mother    Encounter for routine postpartum follow-up 10/08/2018    Encounter for postpartum visit    Endocrine, nutritional and metabolic diseases complicating pregnancy, unspecified trimester 03/19/2019    Hypothyroidism affecting pregnancy    Inappropriate change in quantitative human chorionic gonadotropin (hCG) in early pregnancy 05/22/2018    Chemical pregnancy    Other conditions influencing health status     Cyst    Other specified health status     No pertinent past medical history    Personal history of other diseases of the female genital tract 01/29/2018    History of dysmenorrhea  "   Polyp of corpus uteri 2017    Endometrial polyp    Recurrent pregnancy loss 2017    Recurrent pregnancy loss without current pregnancy    Supervision of elderly multigravida, unspecified trimester 2019    AMA (advanced maternal age) multigravida 35+    Supervision of elderly primigravida, unspecified trimester 11/15/2016    AMA (advanced maternal age) primigravida 35+    Threatened  2017    Threatened miscarriage in early pregnancy       PAST SURGICAL HISTORY:   Past Surgical History:   Procedure Laterality Date    CHOLECYSTECTOMY  2018    Cholecystectomy       MEDICATIONS:   Current Outpatient Medications:     aspirin 81 mg chewable tablet, -, Disp: , Rfl:     docusate sodium (Colace) 100 mg capsule, Take by mouth 2 times a day as needed., Disp: , Rfl:     levothyroxine (Synthroid, Levoxyl) 175 mcg tablet, TAKE 1 TABLET BY MOUTH EVERY MORNING BEFORE MEALS., Disp: 90 tablet, Rfl: 3    losartan (Cozaar) 25 mg tablet, TAKE 1 TABLET (25 MG) BY MOUTH ONCE DAILY., Disp: 90 tablet, Rfl: 3    semaglutide 2 mg/dose (8 mg/3 mL) pen injector, Inject 2 mg under the skin 1 (one) time per week., Disp: 3 mL, Rfl: 0    tacrolimus (Protopic) 0.1 % ointment, 1 Application, Disp: , Rfl:     ALLERGIES: No Known Allergies    SOCIAL HISTORY:   reports that she has never smoked. She has never used smokeless tobacco.    FAMILY HISTORY: family history includes Atrial fibrillation in her mother; Cataracts in her mother; Fuchs' dystrophy in her mother; Glaucoma in her mother; Macular degeneration in an other family member.    REVIEW OF SYSTEMS  Review of Systems   Constitutional: Negative.    HENT:  Positive for ear pain and hearing loss. Negative for ear discharge.    Respiratory: Negative.     Cardiovascular: Negative.    Gastrointestinal:  Positive for nausea.   Neurological: Negative.        PHYSICAL EXAM:    VITALS:   Vitals:    23 0853   Temp: 36.4 °C (97.5 °F)   Height: 1.676 m (5' 6\")    "     GENERAL: healthy, alert, well developed, well nourished, no distress, cooperative, appears chronologic age    Communicates with normal voice and without hearing aids.    HEAD: atraumatic, normocephalic, no lesions    EYES: normal, PERRLA and EOM's intact    EARS:   Right ear demonstrates a patent external auditory canal with a normal tympanic membrane.  The tympanic membrane moves with respiration with occlusion of the left nare.  There is no effusion.  There is no evidence of tympanic membrane perforation or infection.  Left ear: demonstrates a patent external auditory canal with a normal tympanic membrane.   Angel tuning fork test lateralizes right 512 Hz.     NOSE: nose shows no deformity, asymmetry, or inflammation, nasal mucosa normal,     ORAL CAVITY/OROPHARYNX: negative findings: lips normal without lesions, buccal mucosa normal, gums healthy, teeth intact, non-carious, palate normal, tongue midline and normal, soft palate, uvula, and tonsils normal    NECK AND SALIVARY GLANDS: Neck is supple without masses or lymphadenopathy. Palpation of the parotid and submandibular gland reveals no masses or tenderness to palpation.    CRANIAL NERVES: intact,   Facial nerve exam  is House - Brackmann 1- Normal Function on the right and 1- Normal Function on the left.    CARDIOVASCULAR: No evidence of peripheral edema    PULMONARY: normal lung excursion, no evidence of retractions    DATA REVIEWED:  Audiogram  I reviewed the audiogram from 10/17/2023, which demonstrates bilateral normal hearing with a right-sided type B tympanogram.  Word recognition score was excellent bilaterally    IMPRESSION:   1) right ear fullness, muffled hearing, nausea with certain noises, noisy respirations -- resolved  2) Left ear fullness in the setting of URI    PLAN:  Because she has only had the symptoms for a brief period of time in the setting of a URI, she may have subtle Eustachian tube dysfunction.  I recommend follow-up in 6 weeks  to see if the symptoms persist after the URI symptoms resolve.    David Linder MD

## 2024-01-08 ENCOUNTER — OFFICE VISIT (OUTPATIENT)
Dept: DERMATOLOGY | Facility: CLINIC | Age: 49
End: 2024-01-08
Payer: COMMERCIAL

## 2024-01-08 DIAGNOSIS — D22.9 MULTIPLE BENIGN MELANOCYTIC NEVI: ICD-10-CM

## 2024-01-08 DIAGNOSIS — L57.8 SUN-DAMAGED SKIN: ICD-10-CM

## 2024-01-08 DIAGNOSIS — D23.9 DERMATOFIBROMA: ICD-10-CM

## 2024-01-08 DIAGNOSIS — L30.9 DERMATITIS: ICD-10-CM

## 2024-01-08 DIAGNOSIS — Z80.8 FAMILY HISTORY OF NONMELANOMA SKIN CANCER: ICD-10-CM

## 2024-01-08 DIAGNOSIS — L40.9 PSORIASIS: Primary | ICD-10-CM

## 2024-01-08 PROCEDURE — 1036F TOBACCO NON-USER: CPT | Performed by: DERMATOLOGY

## 2024-01-08 PROCEDURE — 99214 OFFICE O/P EST MOD 30 MIN: CPT | Performed by: DERMATOLOGY

## 2024-01-08 ASSESSMENT — DERMATOLOGY QUALITY OF LIFE (QOL) ASSESSMENT
RATE HOW EMOTIONALLY BOTHERED YOU ARE BY YOUR SKIN PROBLEM (FOR EXAMPLE, WORRY, EMBARRASSMENT, FRUSTRATION): 1
RATE HOW BOTHERED YOU ARE BY EFFECTS OF YOUR SKIN PROBLEMS ON YOUR ACTIVITIES (EG, GOING OUT, ACCOMPLISHING WHAT YOU WANT, WORK ACTIVITIES OR YOUR RELATIONSHIPS WITH OTHERS): 0 - NEVER BOTHERED
WHAT SINGLE SKIN CONDITION LISTED BELOW IS THE PATIENT ANSWERING THE QUALITY-OF-LIFE ASSESSMENT QUESTIONS ABOUT: PSORIASIS
DATE THE QUALITY-OF-LIFE ASSESSMENT WAS COMPLETED: 66847
RATE HOW BOTHERED YOU ARE BY SYMPTOMS OF YOUR SKIN PROBLEM (EG, ITCHING, STINGING BURNING, HURTING OR SKIN IRRITATION): 1

## 2024-01-08 ASSESSMENT — DERMATOLOGY PATIENT ASSESSMENT
ARE YOU ON BIRTH CONTROL: NO
HAVE YOU HAD OR DO YOU HAVE A STAPH INFECTION: NO
ARE YOU TRYING TO GET PREGNANT: NO
DO YOU HAVE IRREGULAR MENSTRUAL CYCLES: NO
DO YOU HAVE ANY NEW OR CHANGING LESIONS: YES
ARE YOU AN ORGAN TRANSPLANT RECIPIENT: NO
HAVE YOU HAD OR DO YOU HAVE VASCULAR DISEASE: NO
DO YOU USE SUNSCREEN: DAILY
DO YOU USE A TANNING BED: NO

## 2024-01-08 ASSESSMENT — ITCH NUMERIC RATING SCALE: HOW SEVERE IS YOUR ITCHING?: 1

## 2024-01-08 ASSESSMENT — PATIENT GLOBAL ASSESSMENT (PGA): PATIENT GLOBAL ASSESSMENT: PATIENT GLOBAL ASSESSMENT:  1 - CLEAR

## 2024-01-08 NOTE — PROGRESS NOTES
Subjective     Sobeida Ferrer is a 48 y.o. female who presents for the following: Skin Check and Psoriasis.     Review of Systems:  No other skin or systemic complaints other than what is documented elsewhere in the note.    The following portions of the chart were reviewed this encounter and updated as appropriate:       Specialty Problems    None    Past Medical History:  Sobeida Ferrer  has a past medical history of Encounter for other procreative management (07/15/2017), Encounter for prophylactic Rho(d) immune globulin (11/15/2016), Encounter for routine postpartum follow-up (10/08/2018), Endocrine, nutritional and metabolic diseases complicating pregnancy, unspecified trimester (2019), Inappropriate change in quantitative human chorionic gonadotropin (hCG) in early pregnancy (2018), Other conditions influencing health status, Other specified health status, Personal history of other diseases of the female genital tract (2018), Polyp of corpus uteri (2017), Recurrent pregnancy loss (2017), Supervision of elderly multigravida, unspecified trimester (2019), Supervision of elderly primigravida, unspecified trimester (11/15/2016), and Threatened  (2017).    Past Surgical History:  Sobeida Ferrer  has a past surgical history that includes Cholecystectomy (2018).    Family History:  Patient family history includes Atrial fibrillation in her mother; Cataracts in her mother; Fuchs' dystrophy in her mother; Glaucoma in her mother; Macular degeneration in an other family member.    Social History:  Sobeida Ferrer  reports that she has never smoked. She has never used smokeless tobacco. No history on file for alcohol use and drug use.    Allergies:  Patient has no known allergies.    Current Medications / CAM's:    Current Outpatient Medications:     aspirin 81 mg chewable tablet, -, Disp: , Rfl:     docusate sodium (Colace) 100 mg capsule, Take by mouth 2 times a  day as needed., Disp: , Rfl:     levothyroxine (Synthroid, Levoxyl) 175 mcg tablet, TAKE 1 TABLET BY MOUTH EVERY MORNING BEFORE MEALS., Disp: 90 tablet, Rfl: 3    losartan (Cozaar) 25 mg tablet, TAKE 1 TABLET (25 MG) BY MOUTH ONCE DAILY., Disp: 90 tablet, Rfl: 3    semaglutide 2 mg/dose (8 mg/3 mL) pen injector, Inject 2 mg under the skin 1 (one) time per week., Disp: 3 mL, Rfl: 0    tacrolimus (Protopic) 0.1 % ointment, 1 Application, Disp: , Rfl:      Objective   Well appearing patient in no apparent distress; mood and affect are within normal limits.    A full examination was performed including scalp, head, eyes, ears, nose, lips, neck, chest, axillae, abdomen, back, buttocks, bilateral upper extremities, bilateral lower extremities, hands, feet, fingers, toes, fingernails, and toenails. All findings within normal limits unless otherwise noted below.    - scattered tan macules, telangiectasias, and general photo-damage    - scattered regular brown macules and papules    Left Cavum, Right Cavum  Thin scaly papules    Left Lower Eyelid, Left Upper Eyelid, Right Lower Eyelid, Right Upper Eyelid  Scaly erythematous papules and plaque on bilateral eyelids.     Left Lower Leg - Posterior, Left Shoulder - Anterior, Left Upper Arm - Posterior  Firm pink-brown papule that dimples with lateral pressure.         Assessment/Plan   Psoriasis  Left Cavum; Right Cavum    Psoriasis vulgaris, bilateral conchal bowls  BSA <1%  - reviewed nature of the disorder including genetic, environmental and auto inflammatory components. Reviewed need to manage rather can cure condition. -Reviewed associations with psoriatic arthritis and increased cardiovascular risk. Patient to follow for annual physical exams and blood work with PCP. ROS for musculoskeletal involvement today. - Patient has a history of osteoporosis, she follows regularly with ortho for this; she understands the low threshold to consider a rheumatology evaluation if joint  pain or stiffness worsen.  -Minimal involvement of conchal bowls today, patient still has tacrolimus ointment at home and denies the need for refills today but is ok to call for refill if needed before next visit.     Related Procedures  Follow Up In Dermatology - Established Patient    Dermatitis  Left Upper Eyelid; Right Upper Eyelid; Left Lower Eyelid; Right Lower Eyelid    -Intermittent eyelid dermatitis of unknown etiology.  -We discussed that eyelid dermatitis could be her psoriasis, contact allergy, and also has a history of seborrheic dermatitis.   -Eyelids are clear today and there are no photos to view.  -Can use the already prescribed tacrolimus ointment PRN and if this continues to re flare she should call for an acute visit for evaluation.     Dermatofibroma (3)  Left Shoulder - Anterior; Left Upper Arm - Posterior; Left Lower Leg - Posterior    -The benign nature of the lesion was discussed and the patient was reassured that no treatment is necessary.     Sun-damaged skin    Actinically damaged skin-  - Sun protective behavior reviewed and encouraged including the use of over-the-counter sunscreen with SPF30+ daily (reapply every 1.5 hours when outdoors), UPF clothing, broad rimmed hats, sunglasses, and avoidance of midday sun. Home skin monitoring encouraged and how to monitor for skin cancer (changing or new moles, new rapidly growing or non-healing lesions) reviewed. Patient encouraged to call with interval concerns or changes.      Multiple benign melanocytic nevi    Benign melanocytic nevi  - Discussed benign nature and that no treatment is necessary unless it becomes painful or increases in size. Patient opts for clinical monitoring at this time.    - Sun protective behavior reviewed and encouraged including the use of over-the-counter sunscreen with SPF30+ daily (reapply every 1.5 hours when outdoors), UPF clothing, broad rimmed hats, sunglasses, and avoidance of midday sun. Home skin monitoring  encouraged and how to monitor for skin cancer (changing or new moles, new rapidly growing or non-healing lesions) reviewed. Patient encouraged to call with interval concerns or changes.      Family history of nonmelanoma skin cancer       FUV 1 year FBSE and psoriasis med management, sooner with concerns    Kristen Muhammad    I saw and evaluated the patient. I personally obtained the key and critical portions of the history and physical exam or was physically present for key and critical portions performed by the resident/fellow. I reviewed the resident/fellow's documentation and discussed the patient with the resident/fellow. I agree with the resident/fellow's medical decision making as documented in the note.    Shirlene Gonzalez MD

## 2024-01-15 DIAGNOSIS — Z86.39 PERSONAL HISTORY OF OTHER ENDOCRINE, NUTRITIONAL AND METABOLIC DISEASE: ICD-10-CM

## 2024-01-23 ENCOUNTER — APPOINTMENT (OUTPATIENT)
Dept: PRIMARY CARE | Facility: CLINIC | Age: 49
End: 2024-01-23
Payer: COMMERCIAL

## 2024-01-30 ENCOUNTER — PHARMACY VISIT (OUTPATIENT)
Dept: PHARMACY | Facility: CLINIC | Age: 49
End: 2024-01-30
Payer: COMMERCIAL

## 2024-01-30 ENCOUNTER — OFFICE VISIT (OUTPATIENT)
Dept: OTOLARYNGOLOGY | Facility: CLINIC | Age: 49
End: 2024-01-30
Payer: COMMERCIAL

## 2024-01-30 VITALS — TEMPERATURE: 98.1 F

## 2024-01-30 DIAGNOSIS — H93.8X1 SENSATION OF FULLNESS IN RIGHT EAR: ICD-10-CM

## 2024-01-30 DIAGNOSIS — H93.8X2 SENSATION OF FULLNESS IN LEFT EAR: Primary | ICD-10-CM

## 2024-01-30 PROCEDURE — RXMED WILLOW AMBULATORY MEDICATION CHARGE

## 2024-01-30 PROCEDURE — 1036F TOBACCO NON-USER: CPT | Performed by: STUDENT IN AN ORGANIZED HEALTH CARE EDUCATION/TRAINING PROGRAM

## 2024-01-30 PROCEDURE — 99213 OFFICE O/P EST LOW 20 MIN: CPT | Performed by: STUDENT IN AN ORGANIZED HEALTH CARE EDUCATION/TRAINING PROGRAM

## 2024-01-30 ASSESSMENT — PATIENT HEALTH QUESTIONNAIRE - PHQ9
1. LITTLE INTEREST OR PLEASURE IN DOING THINGS: NOT AT ALL
2. FEELING DOWN, DEPRESSED OR HOPELESS: NOT AT ALL
SUM OF ALL RESPONSES TO PHQ9 QUESTIONS 1 AND 2: 0

## 2024-01-30 ASSESSMENT — ENCOUNTER SYMPTOMS
CARDIOVASCULAR NEGATIVE: 1
RESPIRATORY NEGATIVE: 1
CONSTITUTIONAL NEGATIVE: 1
NEUROLOGICAL NEGATIVE: 1
NAUSEA: 1

## 2024-01-30 NOTE — PROGRESS NOTES
CHIEF COMPLAINT:   Chief Complaint   Patient presents with    Ear Fullness       HISTORY OF PRESENT ILLNESS from 10/18/2023: Sobeida Ferrer is a 48 y.o. female who presents today with symptoms of Right ear fullness, muffled hearing, feeling like she is in a wind tunnel, and hearing herself breath.  She reports that this all started two weeks ago when she was on a [place] [place] and went down a water slide and felt water go into her ears.  She had sudden pain, which has improved, but she has this persistent feeling of fullness and muffled hearing that is worse on the right.  She also notes that she has certain fans and lights that make sounds that make her nauseous.  She denies ear surgery or ear tubes.  She also has a history of thyroid disease and has significant weight fluctuations in the past.    Interval History from 12/12/2023: She reports that her right ear has improved, but now she has left ear fullness and similar symptoms in the left ear.  She reports that she also has had URI symptoms for the past 4 weeks while she has been having these symptoms on the left ear.    Interval History:  She has had resolution of all of her ear symptoms.      PAST MEDICAL HISTORY:   Past Medical History:   Diagnosis Date    Encounter for other procreative management 07/15/2017    Encounter for artificial insemination    Encounter for prophylactic Rho(d) immune globulin 11/15/2016    Need for rhogam due to Rh negative mother    Encounter for routine postpartum follow-up 10/08/2018    Encounter for postpartum visit    Endocrine, nutritional and metabolic diseases complicating pregnancy, unspecified trimester 03/19/2019    Hypothyroidism affecting pregnancy    Inappropriate change in quantitative human chorionic gonadotropin (hCG) in early pregnancy 05/22/2018    Chemical pregnancy    Other conditions influencing health status     Cyst    Other specified health status     No pertinent past medical history    Personal history of  other diseases of the female genital tract 2018    History of dysmenorrhea    Polyp of corpus uteri 2017    Endometrial polyp    Recurrent pregnancy loss 2017    Recurrent pregnancy loss without current pregnancy    Supervision of elderly multigravida, unspecified trimester 2019    AMA (advanced maternal age) multigravida 35+    Supervision of elderly primigravida, unspecified trimester 11/15/2016    AMA (advanced maternal age) primigravida 35+    Threatened  2017    Threatened miscarriage in early pregnancy       PAST SURGICAL HISTORY:   Past Surgical History:   Procedure Laterality Date    CHOLECYSTECTOMY  2018    Cholecystectomy       MEDICATIONS:   Current Outpatient Medications:     aspirin 81 mg chewable tablet, -, Disp: , Rfl:     docusate sodium (Colace) 100 mg capsule, Take by mouth 2 times a day as needed., Disp: , Rfl:     levothyroxine (Synthroid, Levoxyl) 175 mcg tablet, TAKE 1 TABLET BY MOUTH EVERY MORNING BEFORE MEALS., Disp: 90 tablet, Rfl: 3    losartan (Cozaar) 25 mg tablet, TAKE 1 TABLET (25 MG) BY MOUTH ONCE DAILY., Disp: 90 tablet, Rfl: 3    semaglutide 2 mg/dose (8 mg/3 mL) pen injector, Inject 2 mg under the skin 1 (one) time per week., Disp: 3 mL, Rfl: 2    tacrolimus (Protopic) 0.1 % ointment, 1 Application, Disp: , Rfl:     ALLERGIES: No Known Allergies    SOCIAL HISTORY:   reports that she has never smoked. She has never used smokeless tobacco.    FAMILY HISTORY: family history includes Atrial fibrillation in her mother; Cataracts in her mother; Fuchs' dystrophy in her mother; Glaucoma in her mother; Macular degeneration in an other family member.    REVIEW OF SYSTEMS  Review of Systems   Constitutional: Negative.    HENT:  Positive for ear pain and hearing loss. Negative for ear discharge.    Respiratory: Negative.     Cardiovascular: Negative.    Gastrointestinal:  Positive for nausea.   Neurological: Negative.        PHYSICAL EXAM:    VITALS:    Vitals:    01/30/24 0846   Temp: 36.7 °C (98.1 °F)        GENERAL: healthy, alert, well developed, well nourished, no distress, cooperative, appears chronologic age    Communicates with normal voice and without hearing aids.    HEAD: atraumatic, normocephalic, no lesions    EYES: normal, PERRLA and EOM's intact    EARS:   Right ear demonstrates a patent external auditory canal with a normal tympanic membrane.   Left ear: demonstrates a patent external auditory canal with a normal tympanic membrane.     NOSE: nose shows no deformity, asymmetry, or inflammation, nasal mucosa normal,     ORAL CAVITY/OROPHARYNX: negative findings: lips normal without lesions, buccal mucosa normal, gums healthy, teeth intact, non-carious, palate normal, tongue midline and normal, soft palate, uvula, and tonsils normal    NECK AND SALIVARY GLANDS: Neck is supple without masses or lymphadenopathy. Palpation of the parotid and submandibular gland reveals no masses or tenderness to palpation.    CRANIAL NERVES: intact,   Facial nerve exam  is House - Brackmann 1- Normal Function on the right and 1- Normal Function on the left.    CARDIOVASCULAR: No evidence of peripheral edema    PULMONARY: normal lung excursion, no evidence of retractions    DATA REVIEWED:  Audiogram  I reviewed the audiogram from 10/17/2023, which demonstrates bilateral normal hearing with a right-sided type B tympanogram.  Word recognition score was excellent bilaterally    IMPRESSION:   1) right ear fullness, muffled hearing, nausea with certain noises, noisy respirations -- resolved  2) Left ear fullness in the setting of URI -- resolved.    PLAN:  She has had resolution of her ear symptoms.  She may follow-up if she has recurrence of her symptoms.  If she has more Eustachian tube issues, we may consider Eustachian tube dilation in the future.    David Linder MD

## 2024-03-27 ENCOUNTER — APPOINTMENT (OUTPATIENT)
Dept: PRIMARY CARE | Facility: CLINIC | Age: 49
End: 2024-03-27
Payer: COMMERCIAL

## 2024-05-03 ENCOUNTER — PHARMACY VISIT (OUTPATIENT)
Dept: PHARMACY | Facility: CLINIC | Age: 49
End: 2024-05-03
Payer: COMMERCIAL

## 2024-05-03 PROCEDURE — RXMED WILLOW AMBULATORY MEDICATION CHARGE

## 2024-07-29 PROCEDURE — RXMED WILLOW AMBULATORY MEDICATION CHARGE

## 2024-08-03 ENCOUNTER — LAB (OUTPATIENT)
Dept: LAB | Facility: LAB | Age: 49
End: 2024-08-03
Payer: COMMERCIAL

## 2024-08-03 ENCOUNTER — PHARMACY VISIT (OUTPATIENT)
Dept: PHARMACY | Facility: CLINIC | Age: 49
End: 2024-08-03
Payer: COMMERCIAL

## 2024-08-03 DIAGNOSIS — Z00.00 HEALTH CARE MAINTENANCE: ICD-10-CM

## 2024-08-03 LAB
25(OH)D3 SERPL-MCNC: 27 NG/ML (ref 30–100)
ALBUMIN SERPL BCP-MCNC: 4.1 G/DL (ref 3.4–5)
ALP SERPL-CCNC: 72 U/L (ref 33–110)
ALT SERPL W P-5'-P-CCNC: 13 U/L (ref 7–45)
ANION GAP SERPL CALC-SCNC: 15 MMOL/L (ref 10–20)
AST SERPL W P-5'-P-CCNC: 16 U/L (ref 9–39)
BASOPHILS # BLD AUTO: 0.04 X10*3/UL (ref 0–0.1)
BASOPHILS NFR BLD AUTO: 0.5 %
BILIRUB SERPL-MCNC: 0.6 MG/DL (ref 0–1.2)
BUN SERPL-MCNC: 15 MG/DL (ref 6–23)
CALCIUM SERPL-MCNC: 9.7 MG/DL (ref 8.6–10.6)
CHLORIDE SERPL-SCNC: 106 MMOL/L (ref 98–107)
CHOLEST SERPL-MCNC: 194 MG/DL (ref 0–199)
CHOLESTEROL/HDL RATIO: 4.2
CO2 SERPL-SCNC: 24 MMOL/L (ref 21–32)
CREAT SERPL-MCNC: 0.9 MG/DL (ref 0.5–1.05)
EGFRCR SERPLBLD CKD-EPI 2021: 80 ML/MIN/1.73M*2
EOSINOPHIL # BLD AUTO: 0.18 X10*3/UL (ref 0–0.7)
EOSINOPHIL NFR BLD AUTO: 2.3 %
ERYTHROCYTE [DISTWIDTH] IN BLOOD BY AUTOMATED COUNT: 12.4 % (ref 11.5–14.5)
EST. AVERAGE GLUCOSE BLD GHB EST-MCNC: 97 MG/DL
GLUCOSE SERPL-MCNC: 98 MG/DL (ref 74–99)
HBA1C MFR BLD: 5 %
HCT VFR BLD AUTO: 41.1 % (ref 36–46)
HDLC SERPL-MCNC: 46.2 MG/DL
HGB BLD-MCNC: 13.8 G/DL (ref 12–16)
IMM GRANULOCYTES # BLD AUTO: 0.03 X10*3/UL (ref 0–0.7)
IMM GRANULOCYTES NFR BLD AUTO: 0.4 % (ref 0–0.9)
LDLC SERPL CALC-MCNC: 134 MG/DL
LYMPHOCYTES # BLD AUTO: 1.75 X10*3/UL (ref 1.2–4.8)
LYMPHOCYTES NFR BLD AUTO: 22.6 %
MCH RBC QN AUTO: 30 PG (ref 26–34)
MCHC RBC AUTO-ENTMCNC: 33.6 G/DL (ref 32–36)
MCV RBC AUTO: 89 FL (ref 80–100)
MONOCYTES # BLD AUTO: 0.47 X10*3/UL (ref 0.1–1)
MONOCYTES NFR BLD AUTO: 6.1 %
NEUTROPHILS # BLD AUTO: 5.27 X10*3/UL (ref 1.2–7.7)
NEUTROPHILS NFR BLD AUTO: 68.1 %
NON HDL CHOLESTEROL: 148 MG/DL (ref 0–149)
NRBC BLD-RTO: 0 /100 WBCS (ref 0–0)
PLATELET # BLD AUTO: 322 X10*3/UL (ref 150–450)
POTASSIUM SERPL-SCNC: 4.8 MMOL/L (ref 3.5–5.3)
PROT SERPL-MCNC: 7.1 G/DL (ref 6.4–8.2)
RBC # BLD AUTO: 4.6 X10*6/UL (ref 4–5.2)
SODIUM SERPL-SCNC: 140 MMOL/L (ref 136–145)
T4 FREE SERPL-MCNC: 1.53 NG/DL (ref 0.78–1.48)
TRIGL SERPL-MCNC: 71 MG/DL (ref 0–149)
TSH SERPL-ACNC: 0.65 MIU/L (ref 0.44–3.98)
VLDL: 14 MG/DL (ref 0–40)
WBC # BLD AUTO: 7.7 X10*3/UL (ref 4.4–11.3)

## 2024-08-03 PROCEDURE — 85025 COMPLETE CBC W/AUTO DIFF WBC: CPT

## 2024-08-03 PROCEDURE — 84439 ASSAY OF FREE THYROXINE: CPT

## 2024-08-03 PROCEDURE — 36415 COLL VENOUS BLD VENIPUNCTURE: CPT

## 2024-08-03 PROCEDURE — 82306 VITAMIN D 25 HYDROXY: CPT

## 2024-08-03 PROCEDURE — 80061 LIPID PANEL: CPT

## 2024-08-03 PROCEDURE — 83036 HEMOGLOBIN GLYCOSYLATED A1C: CPT

## 2024-08-03 PROCEDURE — 80053 COMPREHEN METABOLIC PANEL: CPT

## 2024-08-03 PROCEDURE — 84443 ASSAY THYROID STIM HORMONE: CPT

## 2024-08-28 ENCOUNTER — HOSPITAL ENCOUNTER (OUTPATIENT)
Dept: RADIOLOGY | Facility: HOSPITAL | Age: 49
Discharge: HOME | End: 2024-08-28
Payer: COMMERCIAL

## 2024-08-28 VITALS — WEIGHT: 227 LBS | HEIGHT: 66 IN | BODY MASS INDEX: 36.48 KG/M2

## 2024-08-28 DIAGNOSIS — Z12.31 BREAST CANCER SCREENING BY MAMMOGRAM: ICD-10-CM

## 2024-08-28 PROCEDURE — 77063 BREAST TOMOSYNTHESIS BI: CPT | Performed by: RADIOLOGY

## 2024-08-28 PROCEDURE — 77067 SCR MAMMO BI INCL CAD: CPT

## 2024-08-28 PROCEDURE — 77067 SCR MAMMO BI INCL CAD: CPT | Performed by: RADIOLOGY

## 2024-08-29 PROCEDURE — RXMED WILLOW AMBULATORY MEDICATION CHARGE

## 2024-08-30 ENCOUNTER — PHARMACY VISIT (OUTPATIENT)
Dept: PHARMACY | Facility: CLINIC | Age: 49
End: 2024-08-30
Payer: COMMERCIAL

## 2024-10-03 PROCEDURE — RXMED WILLOW AMBULATORY MEDICATION CHARGE

## 2024-10-07 ENCOUNTER — PHARMACY VISIT (OUTPATIENT)
Dept: PHARMACY | Facility: CLINIC | Age: 49
End: 2024-10-07
Payer: COMMERCIAL

## 2024-10-24 PROCEDURE — RXMED WILLOW AMBULATORY MEDICATION CHARGE

## 2024-10-25 ENCOUNTER — PHARMACY VISIT (OUTPATIENT)
Dept: PHARMACY | Facility: CLINIC | Age: 49
End: 2024-10-25
Payer: COMMERCIAL

## 2024-11-06 PROCEDURE — RXMED WILLOW AMBULATORY MEDICATION CHARGE

## 2024-11-08 ENCOUNTER — PHARMACY VISIT (OUTPATIENT)
Dept: PHARMACY | Facility: CLINIC | Age: 49
End: 2024-11-08
Payer: COMMERCIAL

## 2024-11-22 PROCEDURE — RXMED WILLOW AMBULATORY MEDICATION CHARGE

## 2024-11-24 ENCOUNTER — PHARMACY VISIT (OUTPATIENT)
Dept: PHARMACY | Facility: CLINIC | Age: 49
End: 2024-11-24
Payer: COMMERCIAL

## 2024-12-16 PROCEDURE — RXMED WILLOW AMBULATORY MEDICATION CHARGE

## 2024-12-17 ENCOUNTER — PHARMACY VISIT (OUTPATIENT)
Dept: PHARMACY | Facility: CLINIC | Age: 49
End: 2024-12-17
Payer: COMMERCIAL

## 2025-01-07 ENCOUNTER — APPOINTMENT (OUTPATIENT)
Dept: DERMATOLOGY | Facility: CLINIC | Age: 50
End: 2025-01-07
Payer: COMMERCIAL

## 2025-01-14 ENCOUNTER — APPOINTMENT (OUTPATIENT)
Dept: DERMATOLOGY | Facility: CLINIC | Age: 50
End: 2025-01-14
Payer: COMMERCIAL

## 2025-01-14 ENCOUNTER — PHARMACY VISIT (OUTPATIENT)
Dept: PHARMACY | Facility: CLINIC | Age: 50
End: 2025-01-14
Payer: COMMERCIAL

## 2025-01-14 DIAGNOSIS — B09 VIRAL EXANTHEM: ICD-10-CM

## 2025-01-14 DIAGNOSIS — L30.8 ASTEATOTIC ECZEMA: ICD-10-CM

## 2025-01-14 DIAGNOSIS — Z12.83 SCREENING EXAM FOR SKIN CANCER: ICD-10-CM

## 2025-01-14 DIAGNOSIS — D23.9 DERMATOFIBROMA: ICD-10-CM

## 2025-01-14 DIAGNOSIS — L40.9 PSORIASIS: Primary | ICD-10-CM

## 2025-01-14 DIAGNOSIS — D22.9 MULTIPLE BENIGN MELANOCYTIC NEVI: ICD-10-CM

## 2025-01-14 DIAGNOSIS — L57.8 SUN-DAMAGED SKIN: ICD-10-CM

## 2025-01-14 PROCEDURE — 99214 OFFICE O/P EST MOD 30 MIN: CPT | Performed by: DERMATOLOGY

## 2025-01-14 PROCEDURE — RXMED WILLOW AMBULATORY MEDICATION CHARGE

## 2025-01-14 RX ORDER — TACROLIMUS 1 MG/G
1 OINTMENT TOPICAL 2 TIMES DAILY
Qty: 60 G | Refills: 3 | Status: SHIPPED | OUTPATIENT
Start: 2025-01-14

## 2025-01-14 RX ORDER — TRIAMCINOLONE ACETONIDE 1 MG/ML
LOTION TOPICAL 2 TIMES DAILY
Qty: 60 ML | Refills: 3 | Status: SHIPPED | OUTPATIENT
Start: 2025-01-14 | End: 2025-02-13

## 2025-01-14 ASSESSMENT — DERMATOLOGY PATIENT ASSESSMENT
DO YOU USE A TANNING BED: NO
HAVE YOU HAD OR DO YOU HAVE VASCULAR DISEASE: NO
DO YOU HAVE ANY NEW OR CHANGING LESIONS: NO
ARE YOU TRYING TO GET PREGNANT: NO
DO YOU HAVE IRREGULAR MENSTRUAL CYCLES: NO
ARE YOU AN ORGAN TRANSPLANT RECIPIENT: NO
DO YOU USE SUNSCREEN: DAILY
HAVE YOU HAD OR DO YOU HAVE A STAPH INFECTION: NO
ARE YOU ON BIRTH CONTROL: NO

## 2025-01-14 ASSESSMENT — PATIENT GLOBAL ASSESSMENT (PGA): PATIENT GLOBAL ASSESSMENT: PATIENT GLOBAL ASSESSMENT:  1 - CLEAR

## 2025-01-14 ASSESSMENT — DERMATOLOGY QUALITY OF LIFE (QOL) ASSESSMENT
ARE THERE EXCLUSIONS OR EXCEPTIONS FOR THE QUALITY OF LIFE ASSESSMENT: NO
RATE HOW BOTHERED YOU ARE BY EFFECTS OF YOUR SKIN PROBLEMS ON YOUR ACTIVITIES (EG, GOING OUT, ACCOMPLISHING WHAT YOU WANT, WORK ACTIVITIES OR YOUR RELATIONSHIPS WITH OTHERS): 0 - NEVER BOTHERED
DATE THE QUALITY-OF-LIFE ASSESSMENT WAS COMPLETED: 67219
RATE HOW BOTHERED YOU ARE BY SYMPTOMS OF YOUR SKIN PROBLEM (EG, ITCHING, STINGING BURNING, HURTING OR SKIN IRRITATION): 0 - NEVER BOTHERED
RATE HOW EMOTIONALLY BOTHERED YOU ARE BY YOUR SKIN PROBLEM (FOR EXAMPLE, WORRY, EMBARRASSMENT, FRUSTRATION): 0 - NEVER BOTHERED

## 2025-01-14 ASSESSMENT — ITCH NUMERIC RATING SCALE: HOW SEVERE IS YOUR ITCHING?: 0

## 2025-01-14 NOTE — PROGRESS NOTES
Subjective     Sobeida Ferrer is a 49 y.o. female who presents for the following: Skin Check (No concerns voiced today.)    Patient was also previously diagnosed with psoriasis affecting her conchal bowls and posterior scalp, which have been flaring and itchy. For her conchal bowls, she has been using tacrolimus bid for 4-5 days at a time which helps, and for her posterior scalp, she notes that she has not been consistently using previously prescribed fluocinonide for this.    Skin Cancer History  No skin cancer on file.      Review of Systems:  No other skin or systemic complaints other than what is documented elsewhere in the note.    The following portions of the chart were reviewed this encounter and updated as appropriate:       Specialty Problems    None    Past Medical History:  Sobeida Ferrer  has a past medical history of Encounter for other procreative management (07/15/2017), Encounter for prophylactic Rho(d) immune globulin (11/15/2016), Encounter for routine postpartum follow-up (10/08/2018), Endocrine, nutritional and metabolic diseases complicating pregnancy, unspecified trimester ([facility]) (2019), Inappropriate change in quantitative human chorionic gonadotropin (hCG) in early pregnancy ([facility]) (2018), Other conditions influencing health status, Other specified health status, Personal history of other diseases of the female genital tract (2018), Polyp of corpus uteri (2017), Recurrent pregnancy loss (2017), Supervision of elderly multigravida, unspecified trimester ([facility]) (2019), Supervision of elderly primigravida, unspecified trimester ([facility]) (11/15/2016), and Threatened  ([facility]) (2017).    Past Surgical History:  Sobeida Ferrer  has a past surgical history that includes Cholecystectomy (2018).    Family History:  Patient family history includes Atrial fibrillation in her mother; Breast cancer in her mother's sister; Cataracts in  her mother; Fuchs' dystrophy in her mother; Glaucoma in her mother; Macular degeneration in an other family member.    Social History:  Sobeida Ferrer  reports that she has never smoked. She has never used smokeless tobacco. No history on file for alcohol use and drug use.    Allergies:  Patient has no known allergies.    Current Medications / CAM's:    Current Outpatient Medications:     aspirin 81 mg chewable tablet, - (Patient taking differently: Chew 1 tablet (81 mg) 1 time. -taking PRN), Disp: , Rfl:     levothyroxine (Synthroid, Levoxyl) 175 mcg tablet, Take 1 tablet (175 mcg) by mouth once daily in the morning., Disp: 90 tablet, Rfl: 3    losartan (Cozaar) 25 mg tablet, Take 1 tablet by mouth once a day., Disp: 90 tablet, Rfl: 3    tirzepatide, weight loss, (Zepbound) 15 mg/0.5 mL injection, Inject 15 mg subcutaneously weekly, Disp: 2 mL, Rfl: 2    docusate sodium (Colace) 100 mg capsule, Take by mouth 2 times a day as needed., Disp: , Rfl:     levothyroxine (Synthroid, Levoxyl) 175 mcg tablet, TAKE 1 TABLET BY MOUTH EVERY MORNING BEFORE MEALS., Disp: 90 tablet, Rfl: 3    losartan (Cozaar) 25 mg tablet, TAKE 1 TABLET (25 MG) BY MOUTH ONCE DAILY., Disp: 90 tablet, Rfl: 3    phentermine (Adipex-P) 37.5 mg tablet, Take 1 tablet (37.5 mg) by mouth once daily in the morning. (Patient not taking: Reported on 1/14/2025), Disp: 30 tablet, Rfl: 0    phentermine (Adipex-P) 37.5 mg tablet, Take 1 tablet by mouth in the morning (Patient not taking: Reported on 1/14/2025), Disp: 30 tablet, Rfl: 0    semaglutide 2 mg/dose (8 mg/3 mL) pen injector, Inject 2 mg under the skin 1 (one) time per week. (Patient not taking: Reported on 1/14/2025), Disp: 3 mL, Rfl: 2    tacrolimus (Protopic) 0.1 % ointment, Apply 1 Application topically 2 times a day., Disp: 60 g, Rfl: 3    triamcinolone (Kenalog) 0.1 % lotion, Apply topically 2 times a day for 14 days., Disp: 60 mL, Rfl: 3     Objective   Well appearing patient in no apparent  distress; mood and affect are within normal limits.    A full examination  was performed including scalp, head, eyes, ears, nose, lips, neck, chest, axillae, abdomen, back, buttocks, bilateral upper extremities, bilateral lower extremities, hands, feet, fingers, toes, fingernails, and toenails. All findings within normal limits unless otherwise noted below.    Patient declined genital and gluteal cleft exam.     - scattered regular brown macules and papules    - Scattered tan macules, telangiectasias, and general photo-damage    Left Cavum, Right Cavum  Micaceous scale on bilateral external auditory canals, conchal bowls and thin scaly pink plaque on occipital scalp    Fingernails without notable change    Bilateral upper and lower extremities, abdomen, back  Tiny erythematous papules and patches on bilateral upper extremities, abdomen, bilateral lower extremitites    Back, upper and lower extremities  On the back and upper and lower extremities, there is diffuse erythema with cracked riverbed appearance     Left Lower Leg - Posterior, Left Shoulder - Anterior, Left Upper Arm - Posterior  Firm pink-brown papule that dimples with lateral pressure.        Assessment/Plan   Psoriasis  Left Cavum; Right Cavum    Psoriasis vulgaris, bilateral conchal bowls and posterior neck  BSA <1%  - Reviewed nature of the disorder including genetic, environmental and auto inflammatory components. Reviewed need to manage rather can cure condition.   - No documented history of psoriatic arthritis, though patient does have history of osteoporosis; will re-screen for s/s of psA at next annual visit.   - Exam today with involvement of bilateral conchal bowls, external auditory meatus; erythematous patch on posterior neck  - Continue tacrolimus 0.1% ointment bid to affected areas during flares; refills provided  - Recommend consistent trial of fluocinonide to occipital scalp which patient has at home- use nightly x 3-4 weeks (stop when  smooth), then de-escalate to 2-3 nights per week for maintenance; if flares continues despite consistent treatment, would consider escalation of therapy to another topical steroid at next visit  - Risks of topical corticosteriods reviewed including skin thinning, easy bruising, discoloration with prolonged use.      Related Procedures  Follow Up In Dermatology - Established Patient  Follow Up In Dermatology - Established Patient    Related Medications  tacrolimus (Protopic) 0.1 % ointment  Apply 1 Application topically 2 times a day.    Viral exanthem  Bilateral upper and lower extremities, abdomen, back    - Patient reports viral illness in 12/2024, favor her diffuse erythematous patchy truncal predominant eruption is post-viral eruption likely with overlapping components of her xerosis and asteatosis per above  - Noted that patient has increased erythema, dryness, and pruritus today  - Favor resolving viral exanthem  - Start triamcinolone 0.1% lotion (patient prefers lotions) to bilateral upper and lower extremities, abdomen, and back bid x 14 days. Discussed the common side effects associated with steroid use including lightening of the skin, thinning of the skin, easy bruising, and telangiectasias. Advised patient to avoid application to the face or body folds.    Asteatotic eczema  Back, upper and lower extremities    - Recommend starting Aveeno eczema lotion or thicker emollient (photo shared with patient today) bid to upper and lower extremities and back  - Start triamcinolone 0.1% lotion (patient prefers lotions) to bilateral upper and lower extremities, abdomen, and back bid x 14 days. Discussed the common side effects associated with steroid use including lightening of the skin, thinning of the skin, easy bruising, and telangiectasias. Advised patient to avoid application to the face or body folds.    triamcinolone (Kenalog) 0.1 % lotion - Back, upper and lower extremities  Apply topically 2 times a day  for 14 days.    Multiple benign melanocytic nevi    Benign melanocytic nevi  - Discussed benign nature and that no treatment is necessary unless it becomes painful or increases in size. Patient opts for clinical monitoring at this time.    - Sun protective behavior reviewed and encouraged including the use of over-the-counter sunscreen with SPF30+ daily (reapply every 1.5 hours when outdoors), UPF clothing, broad rimmed hats, sunglasses, and avoidance of midday sun. Home skin monitoring encouraged and how to monitor for skin cancer (changing or new moles, new rapidly growing or non-healing lesions) reviewed. Patient encouraged to call with interval concerns or changes.      Dermatofibroma (3)  Left Shoulder - Anterior; Left Upper Arm - Posterior; Left Lower Leg - Posterior    -The benign nature of the lesion was discussed and the patient was reassured that no treatment is necessary.     Sun-damaged skin    Actinically damaged skin- diffuse  - Sun protective behavior reviewed and encouraged including the use of over-the-counter sunscreen with SPF30+ daily (reapply every 1.5 hours when outdoors), UPF clothing, broad rimmed hats, sunglasses, and avoidance of midday sun. Home skin monitoring encouraged and how to monitor for skin cancer (changing or new moles, new rapidly growing or non-healing lesions) reviewed. Patient encouraged to call with interval concerns or changes.      Screening exam for skin cancer       RTC in 1 year for skin exam.    Malia Goel MD, RAVIN  PGY-3, Department of Dermatology    I saw and evaluated the patient. I personally obtained the key and critical portions of the history and physical exam or was physically present for key and critical portions performed by the resident/fellow. I reviewed the resident/fellow's documentation and discussed the patient with the resident/fellow. I agree with the resident/fellow's medical decision making as documented in the note.    Shirlene Gonzalez MD

## 2025-02-26 DIAGNOSIS — Z12.11 COLON CANCER SCREENING: ICD-10-CM

## 2025-02-26 RX ORDER — SODIUM PICOSULFATE, MAGNESIUM OXIDE, AND ANHYDROUS CITRIC ACID 12; 3.5; 1 G/175ML; G/175ML; MG/175ML
1 LIQUID ORAL SEE ADMIN INSTRUCTIONS
Qty: 350 ML | Refills: 0 | Status: SHIPPED | OUTPATIENT
Start: 2025-02-26

## 2025-03-01 ENCOUNTER — PHARMACY VISIT (OUTPATIENT)
Dept: PHARMACY | Facility: CLINIC | Age: 50
End: 2025-03-01
Payer: COMMERCIAL

## 2025-03-01 PROCEDURE — RXMED WILLOW AMBULATORY MEDICATION CHARGE

## 2025-03-01 RX ORDER — ONDANSETRON 4 MG/1
4 TABLET, FILM COATED ORAL EVERY 8 HOURS PRN
Qty: 30 TABLET | Refills: 0 | OUTPATIENT
Start: 2025-03-01

## 2025-03-12 ENCOUNTER — APPOINTMENT (OUTPATIENT)
Dept: OPHTHALMOLOGY | Facility: CLINIC | Age: 50
End: 2025-03-12
Payer: COMMERCIAL

## 2025-03-12 DIAGNOSIS — H52.4 PRESBYOPIA OF BOTH EYES: ICD-10-CM

## 2025-03-12 DIAGNOSIS — H52.221 REGULAR ASTIGMATISM OF RIGHT EYE: Primary | ICD-10-CM

## 2025-03-12 PROCEDURE — 92014 COMPRE OPH EXAM EST PT 1/>: CPT | Performed by: STUDENT IN AN ORGANIZED HEALTH CARE EDUCATION/TRAINING PROGRAM

## 2025-03-12 PROCEDURE — 92015 DETERMINE REFRACTIVE STATE: CPT | Performed by: STUDENT IN AN ORGANIZED HEALTH CARE EDUCATION/TRAINING PROGRAM

## 2025-03-12 ASSESSMENT — REFRACTION_MANIFEST
OS_ADD: +1.50
OD_AXIS: 140
OD_SPHERE: -0.25
OD_SPHERE: PLANO
OD_AXIS: 120
OS_CYLINDER: SPHERE
OD_ADD: +1.50
OD_CYLINDER: -0.50
OS_SPHERE: PLANO
OS_SPHERE: PLANO
OS_CYLINDER: SPHERE
OD_CYLINDER: -0.50
METHOD_AUTOREFRACTION: 1

## 2025-03-12 ASSESSMENT — VISUAL ACUITY
METHOD: SNELLEN - LINEAR
OS_SC+: -1
OS_SC: 20/20

## 2025-03-12 ASSESSMENT — EXTERNAL EXAM - LEFT EYE: OS_EXAM: NORMAL

## 2025-03-12 ASSESSMENT — CONF VISUAL FIELD
OS_SUPERIOR_TEMPORAL_RESTRICTION: 0
OS_INFERIOR_NASAL_RESTRICTION: 0
METHOD: COUNTING FINGERS
OD_INFERIOR_TEMPORAL_RESTRICTION: 0
OD_SUPERIOR_NASAL_RESTRICTION: 0
OS_NORMAL: 1
OD_NORMAL: 1
OD_SUPERIOR_TEMPORAL_RESTRICTION: 0
OS_INFERIOR_TEMPORAL_RESTRICTION: 0
OS_SUPERIOR_NASAL_RESTRICTION: 0
OD_INFERIOR_NASAL_RESTRICTION: 0

## 2025-03-12 ASSESSMENT — CUP TO DISC RATIO
OS_RATIO: .35
OD_RATIO: .3

## 2025-03-12 ASSESSMENT — SLIT LAMP EXAM - LIDS
COMMENTS: GOOD POSITION
COMMENTS: GOOD POSITION

## 2025-03-12 ASSESSMENT — TONOMETRY: IOP_METHOD: TONOPEN

## 2025-03-12 ASSESSMENT — EXTERNAL EXAM - RIGHT EYE: OD_EXAM: NORMAL

## 2025-03-13 PROBLEM — H52.221 REGULAR ASTIGMATISM OF RIGHT EYE: Status: ACTIVE | Noted: 2025-03-13

## 2025-03-13 PROBLEM — H52.4 PRESBYOPIA OF BOTH EYES: Status: ACTIVE | Noted: 2025-03-13

## 2025-03-13 PROBLEM — H52.13 MYOPIA, BILATERAL: Status: RESOLVED | Noted: 2023-02-21 | Resolved: 2025-03-13

## 2025-03-13 ASSESSMENT — ENCOUNTER SYMPTOMS
NEUROLOGICAL NEGATIVE: 0
MUSCULOSKELETAL NEGATIVE: 0
HEMATOLOGIC/LYMPHATIC NEGATIVE: 0
CONSTITUTIONAL NEGATIVE: 0
GASTROINTESTINAL NEGATIVE: 0
CARDIOVASCULAR NEGATIVE: 0
ENDOCRINE NEGATIVE: 0
ALLERGIC/IMMUNOLOGIC NEGATIVE: 0
EYES NEGATIVE: 0
PSYCHIATRIC NEGATIVE: 0
RESPIRATORY NEGATIVE: 0

## 2025-03-13 ASSESSMENT — TONOMETRY
OD_IOP_MMHG: 10
OS_IOP_MMHG: 11

## 2025-03-13 ASSESSMENT — VISUAL ACUITY: OD_SC: 20/20

## 2025-03-13 NOTE — PROGRESS NOTES
Assessment/Plan   Diagnoses and all orders for this visit:  Regular astigmatism of right eye  Presbyopia of both eyes  -New spec rx released today per patient request. Ocular health wnl for age OU. Monitor 1 year or sooner prn. Refraction billed today.  -Option given to transition to FTW PAL setup    RTC 1 year for annual with TATI and NIDIA

## 2025-04-09 PROCEDURE — RXMED WILLOW AMBULATORY MEDICATION CHARGE

## 2025-04-14 ENCOUNTER — PHARMACY VISIT (OUTPATIENT)
Dept: PHARMACY | Facility: CLINIC | Age: 50
End: 2025-04-14
Payer: COMMERCIAL

## 2025-05-13 PROCEDURE — RXMED WILLOW AMBULATORY MEDICATION CHARGE

## 2025-05-16 ENCOUNTER — PHARMACY VISIT (OUTPATIENT)
Dept: PHARMACY | Facility: CLINIC | Age: 50
End: 2025-05-16
Payer: COMMERCIAL

## 2025-05-23 ENCOUNTER — APPOINTMENT (OUTPATIENT)
Dept: GASTROENTEROLOGY | Facility: EXTERNAL LOCATION | Age: 50
End: 2025-05-23
Payer: COMMERCIAL

## 2025-05-23 DIAGNOSIS — D12.0 BENIGN NEOPLASM OF CECUM: ICD-10-CM

## 2025-05-23 DIAGNOSIS — Z12.11 COLON CANCER SCREENING: Primary | ICD-10-CM

## 2025-05-23 DIAGNOSIS — Z12.11 COLON CANCER SCREENING: ICD-10-CM

## 2025-05-23 PROCEDURE — 45385 COLONOSCOPY W/LESION REMOVAL: CPT | Performed by: INTERNAL MEDICINE

## 2025-05-23 PROCEDURE — 45381 COLONOSCOPY SUBMUCOUS NJX: CPT | Performed by: INTERNAL MEDICINE

## 2025-05-23 NOTE — PROGRESS NOTES
Screening colonoscopy showed 1 small polyp in the cecum.  If the polyps and adenoma repeat appears.

## 2025-08-07 ENCOUNTER — PHARMACY VISIT (OUTPATIENT)
Dept: PHARMACY | Facility: CLINIC | Age: 50
End: 2025-08-07
Payer: COMMERCIAL

## 2025-08-07 PROCEDURE — RXMED WILLOW AMBULATORY MEDICATION CHARGE

## 2025-09-03 ENCOUNTER — APPOINTMENT (OUTPATIENT)
Dept: RADIOLOGY | Facility: HOSPITAL | Age: 50
End: 2025-09-03
Payer: COMMERCIAL

## 2026-01-13 ENCOUNTER — APPOINTMENT (OUTPATIENT)
Dept: DERMATOLOGY | Facility: CLINIC | Age: 51
End: 2026-01-13
Payer: COMMERCIAL

## 2026-03-13 ENCOUNTER — APPOINTMENT (OUTPATIENT)
Dept: OPHTHALMOLOGY | Facility: CLINIC | Age: 51
End: 2026-03-13
Payer: COMMERCIAL